# Patient Record
Sex: MALE | Race: OTHER | Employment: OTHER | URBAN - METROPOLITAN AREA
[De-identification: names, ages, dates, MRNs, and addresses within clinical notes are randomized per-mention and may not be internally consistent; named-entity substitution may affect disease eponyms.]

---

## 2017-02-28 ENCOUNTER — HOSPITAL ENCOUNTER (OUTPATIENT)
Dept: LAB | Age: 67
Discharge: HOME | End: 2017-02-28
Payer: MEDICARE

## 2017-02-28 DIAGNOSIS — C19 MALIGNANT NEOPLASM OF RECTOSIGMOID (COLON) (HCC): ICD-10-CM

## 2017-02-28 LAB
ALBUMIN SERPL BCP-MCNC: 3.7 G/DL (ref 3.2–4.6)
ALBUMIN/GLOB SERPL: 1.1 {RATIO} (ref 1.2–3.5)
ALP SERPL-CCNC: 113 U/L (ref 50–136)
ALT SERPL-CCNC: 31 U/L (ref 12–65)
ANION GAP BLD CALC-SCNC: 6 MMOL/L (ref 7–16)
AST SERPL W P-5'-P-CCNC: 18 U/L (ref 15–37)
BASOPHILS # BLD AUTO: 0 K/UL (ref 0–0.2)
BASOPHILS # BLD: 0 % (ref 0–2)
BILIRUB SERPL-MCNC: 0.6 MG/DL (ref 0.2–1.1)
BUN SERPL-MCNC: 14 MG/DL (ref 8–23)
CALCIUM SERPL-MCNC: 8.6 MG/DL (ref 8.3–10.4)
CEA SERPL-MCNC: 1 NG/ML (ref 0–3)
CHLORIDE SERPL-SCNC: 104 MMOL/L (ref 98–107)
CO2 SERPL-SCNC: 30 MMOL/L (ref 23–32)
CREAT SERPL-MCNC: 0.91 MG/DL (ref 0.8–1.5)
DIFFERENTIAL METHOD BLD: ABNORMAL
EOSINOPHIL # BLD: 0.1 K/UL (ref 0–0.8)
EOSINOPHIL NFR BLD: 1 % (ref 0.5–7.8)
ERYTHROCYTE [DISTWIDTH] IN BLOOD BY AUTOMATED COUNT: 11.8 % (ref 11.9–14.6)
GLOBULIN SER CALC-MCNC: 3.3 G/DL (ref 2.3–3.5)
GLUCOSE SERPL-MCNC: 91 MG/DL (ref 65–100)
HCT VFR BLD AUTO: 43.2 % (ref 41.1–50.3)
HGB BLD-MCNC: 14.9 G/DL (ref 13.6–17.2)
LYMPHOCYTES # BLD AUTO: 16 % (ref 13–44)
LYMPHOCYTES # BLD: 1.1 K/UL (ref 0.5–4.6)
MAGNESIUM SERPL-MCNC: 2.1 MG/DL (ref 1.8–2.4)
MCH RBC QN AUTO: 31.8 PG (ref 26.1–32.9)
MCHC RBC AUTO-ENTMCNC: 34.5 G/DL (ref 31.4–35)
MCV RBC AUTO: 92.3 FL (ref 79.6–97.8)
MONOCYTES # BLD: 0.5 K/UL (ref 0.1–1.3)
MONOCYTES NFR BLD AUTO: 7 % (ref 4–12)
NEUTS SEG # BLD: 5.4 K/UL (ref 1.7–8.2)
NEUTS SEG NFR BLD AUTO: 76 % (ref 43–78)
NRBC # BLD: 0 K/UL (ref 0–0.2)
PLATELET # BLD AUTO: 156 K/UL (ref 150–450)
PMV BLD AUTO: 10 FL (ref 10.8–14.1)
POTASSIUM SERPL-SCNC: 4.1 MMOL/L (ref 3.5–5.1)
PROT SERPL-MCNC: 7 G/DL (ref 6.3–8.2)
RBC # BLD AUTO: 4.68 M/UL (ref 4.23–5.67)
SODIUM SERPL-SCNC: 140 MMOL/L (ref 136–145)
WBC # BLD AUTO: 7 K/UL (ref 4.3–11.1)

## 2017-02-28 PROCEDURE — 80053 COMPREHEN METABOLIC PANEL: CPT | Performed by: INTERNAL MEDICINE

## 2017-02-28 PROCEDURE — 82378 CARCINOEMBRYONIC ANTIGEN: CPT | Performed by: INTERNAL MEDICINE

## 2017-02-28 PROCEDURE — 85025 COMPLETE CBC W/AUTO DIFF WBC: CPT | Performed by: INTERNAL MEDICINE

## 2017-02-28 PROCEDURE — 83735 ASSAY OF MAGNESIUM: CPT | Performed by: INTERNAL MEDICINE

## 2017-03-13 ENCOUNTER — HOSPITAL ENCOUNTER (OUTPATIENT)
Age: 67
Setting detail: OUTPATIENT SURGERY
Discharge: HOME | End: 2017-03-13
Attending: SURGERY | Admitting: SURGERY
Payer: MEDICARE

## 2017-03-13 ENCOUNTER — ANESTHESIA (OUTPATIENT)
Dept: ENDOSCOPY | Age: 67
End: 2017-03-13
Payer: MEDICARE

## 2017-03-13 ENCOUNTER — ANESTHESIA EVENT (OUTPATIENT)
Dept: ENDOSCOPY | Age: 67
End: 2017-03-13
Payer: MEDICARE

## 2017-03-13 ENCOUNTER — SURGERY (OUTPATIENT)
Age: 67
End: 2017-03-13

## 2017-03-13 VITALS
WEIGHT: 186 LBS | OXYGEN SATURATION: 100 % | HEART RATE: 47 BPM | RESPIRATION RATE: 18 BRPM | SYSTOLIC BLOOD PRESSURE: 151 MMHG | HEIGHT: 71 IN | DIASTOLIC BLOOD PRESSURE: 75 MMHG | TEMPERATURE: 97.5 F | BODY MASS INDEX: 26.04 KG/M2

## 2017-03-13 PROCEDURE — 74011250636 HC RX REV CODE- 250/636

## 2017-03-13 PROCEDURE — 76060000032 HC ANESTHESIA 0.5 TO 1 HR: Performed by: SURGERY

## 2017-03-13 PROCEDURE — C1726 CATH, BAL DIL, NON-VASCULAR: HCPCS | Performed by: SURGERY

## 2017-03-13 PROCEDURE — 76040000025: Performed by: SURGERY

## 2017-03-13 PROCEDURE — 74011000250 HC RX REV CODE- 250

## 2017-03-13 PROCEDURE — 74011250636 HC RX REV CODE- 250/636: Performed by: ANESTHESIOLOGY

## 2017-03-13 RX ORDER — LIDOCAINE HYDROCHLORIDE 20 MG/ML
INJECTION, SOLUTION EPIDURAL; INFILTRATION; INTRACAUDAL; PERINEURAL AS NEEDED
Status: DISCONTINUED | OUTPATIENT
Start: 2017-03-13 | End: 2017-03-13 | Stop reason: HOSPADM

## 2017-03-13 RX ORDER — PROPOFOL 10 MG/ML
INJECTION, EMULSION INTRAVENOUS
Status: DISCONTINUED | OUTPATIENT
Start: 2017-03-13 | End: 2017-03-13 | Stop reason: HOSPADM

## 2017-03-13 RX ORDER — PROPOFOL 10 MG/ML
INJECTION, EMULSION INTRAVENOUS AS NEEDED
Status: DISCONTINUED | OUTPATIENT
Start: 2017-03-13 | End: 2017-03-13 | Stop reason: HOSPADM

## 2017-03-13 RX ORDER — SODIUM CHLORIDE, SODIUM LACTATE, POTASSIUM CHLORIDE, CALCIUM CHLORIDE 600; 310; 30; 20 MG/100ML; MG/100ML; MG/100ML; MG/100ML
1000 INJECTION, SOLUTION INTRAVENOUS CONTINUOUS
Status: DISCONTINUED | OUTPATIENT
Start: 2017-03-13 | End: 2017-03-13 | Stop reason: HOSPADM

## 2017-03-13 RX ORDER — GLYCOPYRROLATE 0.2 MG/ML
INJECTION INTRAMUSCULAR; INTRAVENOUS AS NEEDED
Status: DISCONTINUED | OUTPATIENT
Start: 2017-03-13 | End: 2017-03-13 | Stop reason: HOSPADM

## 2017-03-13 RX ADMIN — SODIUM CHLORIDE, SODIUM LACTATE, POTASSIUM CHLORIDE, AND CALCIUM CHLORIDE 1000 ML: 600; 310; 30; 20 INJECTION, SOLUTION INTRAVENOUS at 10:36

## 2017-03-13 RX ADMIN — PROPOFOL 140 MCG/KG/MIN: 10 INJECTION, EMULSION INTRAVENOUS at 11:27

## 2017-03-13 RX ADMIN — PROPOFOL 50 MG: 10 INJECTION, EMULSION INTRAVENOUS at 11:27

## 2017-03-13 RX ADMIN — LIDOCAINE HYDROCHLORIDE 50 MG: 20 INJECTION, SOLUTION EPIDURAL; INFILTRATION; INTRACAUDAL; PERINEURAL at 11:27

## 2017-03-13 RX ADMIN — GLYCOPYRROLATE 0.2 MG: 0.2 INJECTION INTRAMUSCULAR; INTRAVENOUS at 11:30

## 2017-03-13 NOTE — ANESTHESIA PREPROCEDURE EVALUATION
Anesthetic History               Review of Systems / Medical History  Patient summary reviewed    Pulmonary                   Neuro/Psych              Cardiovascular                  Exercise tolerance: >4 METS     GI/Hepatic/Renal     GERD: well controlled      PUD     Endo/Other             Other Findings              Physical Exam    Airway  Mallampati: II  TM Distance: > 6 cm  Neck ROM: normal range of motion   Mouth opening: Normal     Cardiovascular  Regular rate and rhythm,  S1 and S2 normal,  no murmur, click, rub, or gallop             Dental  No notable dental hx       Pulmonary  Breath sounds clear to auscultation               Abdominal         Other Findings            Anesthetic Plan    ASA: 2  Anesthesia type: total IV anesthesia            Anesthetic plan and risks discussed with: Patient      Pt speaks littlle English, hx obtained via

## 2017-03-13 NOTE — IP AVS SNAPSHOT
[address] 
[phone] Patient: Tima Luo MRN: [id number] QPB:1/94/8900 You are allergic to the following Allergen Reactions No Known Allergies Other (comments) Pcn (Penicillins) Other (comments) Pt denies Recent Documentation Height Weight BMI Smoking Status 1.803 m 84.4 kg 25.94 kg/m2 Never Smoker Emergency Contacts Name Discharge Info Relation Home Work Mobile [address] [facility] CAREGIVER [3] Daughter [21] [phone]  Son [22] [phone] Lyudmila Sanchez DISCHARGE CAREGIVER [3] Spouse [3] [phone] About your hospitalization You were admitted on:  March 13, 2017 You last received care in the:  [facility] You were discharged on:  March 13, 2017 Unit phone number:  [phone] Why you were hospitalized Your primary diagnosis was:  Not on File Providers Seen During Your Hospitalizations Provider Role Specialty Primary office phone Tai Snow MD Attending Provider General Surgery [phone] Your Primary Care Physician (PCP) Primary Care Physician Office Phone Office Fax Joe Trammell [phone] Follow-up Information None Current Discharge Medication List  
  
ASK your doctor about these medications Dose & Instructions Dispensing Information Comments Morning Noon Evening Bedtime EMLA topical cream  
Generic drug:  lidocaine-prilocaine Your last dose was: Your next dose is: Other:  _________ Apply  to affected area as needed for Pain. Refills:  0 Discharge Instructions Brandon Milner M.D. 
[phone] Instructions following colonoscopy: 
 
ACTIVITY: 
? Resume usual, basic activities around the house today. ? You may be light-headed or sleepy from anesthesia, so be careful going up and down stairs. ? Avoid driving, operating machinery, or signing documents for 24 hours. DIET: 
? No restriction. Please note, some people may have nausea or cramps after this procedure which can result in an upset stomach after eating. ? Many people have loose stools or diarrhea immediately after colonoscopy. It is also not uncommon to not have a bowel movement for 2-3 days. PAIN: 
? Some cramping or gas pain is normal after colonoscopy. However, if you experience worsening pain over the course of the day, or pain with associated fever please call the office immediately CALL THE DOCTOR IF: 
? You have a temperature higher than 101.5° Fahrenheit for more than 6 hours. ? You have severe nausea or vomiting not relieved by medication; or diarrhea. If you take a blood thinning medicine resume it: 
 
Otherwise, continue home medications as previously prescribed. Discharge Orders None Introducing [facility] SERVICES! [facility] introduce portal paciente [employer] . Ahora se puede acceder a partes de ge expediente médico, enviar por correo electrónico la oficina de ge médico y solicitar renovaciones de medicamentos en línea. En ge navegador de Internet , Jimy Mar a [email or web address] Bob clic en el usuario por Debbie Negrete? Chrissy Just clic aquí en la sesión Daryle Neigh. Verá la página de registro [place]. Ingrese ge código de [facility] joe y vanessa aparece a continuación. Usted no tendrá que [employer] código después de rachel completado el proceso de registro . Si usted no se inscribe antes de la fecha de caducidad , debe solicitar un nuevo código. · [employer] Código de acceso : [id number] Expires: 4/20/2017  9:53 AM 
 
Ingresa los últimos cuatro dígitos de ge Número de Seguro Social ( xxxx ) y fecha de nacimiento ( dd / mm / aaaa ) vanessa se indica y bob clic en Enviar. Usted será llevado a la siguiente página de registro . Crear un ID MyChart . Esta será ge ID de inicio de sesión de MyChart y no puede ser [place] , por lo que pensar en karthikeyan que es Reyne Gretel y fácil de recordar . Crear karthikeyan contraseña MyChart . Usted puede cambiar ge contraseña en cualquier momento . Ingrese ge Password Reset de preguntas y James . [place] se puede utilizar en un momento posterior si usted olvida ge contraseña. Introduzca ge dirección de correo electrónico . Elvis Sow recibirá karthikeyan notificación por correo electrónico cuando la nueva información está disponible en MyChart . Luis Alberto ordonez en Registrarse. Dennice Vassar waleska y descargar porciones de ge expediente médico. 
Dana josé luis en el enlace de descarga del menú Resumen para descargar karthikeyan copia portátil de ge información médica . Si tiene [employer] , por favor visite la sección de preguntas frecuentes del sitio web MyChart . Recuerde, MyChart NO es que se utilizará para las necesidades urgentes. Para emergencias médicas , llame al 911 . Ahora disponible en ge iPhone y Android ! General Information Please provide this summary of care documentation to your next provider. Patient Signature:  ____________________________________________________________ Date:  ____________________________________________________________  
  
[facility] Provider Signature:  ____________________________________________________________ Date:  ____________________________________________________________  
  
  
   
  
Danny Mele Bonner [address] [facility] 
[phone] Patient: Melvin Houston MRN: [id number] LPV:6/85/7609 Tutu denton sigliza Hooks No Known Allergies Other (comments) Pcn (Penicillins) Other (comments) Pt denies Documentación recientes Height Weight BMI (IMC) Estatus de tabaquísmo 1.803 m 84.4 kg 25.94 kg/m2 Never Smoker Emergency Contacts Name Discharge Info Relation Home Work Mobile [address] CAREGIVER [3] Daughter [21] [phone] Nirali Bergeron  Son [22] [phone] Lyudmila Sanchez DISCHARGE CAREGIVER [3] Spouse [3] [phone] Sobre [place] hospitalización Le admitieron el:  March 13, 2017 [place] tratamiento más reciente fue el:  [facility] Le dieron de ricarda el:  March 13, 2017 Número de teléfono de la unidad:  [phone] Por qué le ingresaron [place] diagnosis primaria es:  No está archivado/a Proveedores de verse jasper cindy hospitalizaciones Personal Médico Rol Especialidad Teléfono principal de la oficina Sakina Montes MD Attending Provider General Surgery [phone] [place] médico de atención primaria (PCP ) Primary Care Physician Office Phone Office Fax Nathen Beverage [phone] Follow-up Information Memetales Aprobación de la gestión actual lista de medicamentos CONSULTE con cam[place] médico sobre "[employer] Dosis e instrucciones Información de dispensación Comentarios Morning Noon Evening Bedtime EMLA topical cream  
Medicamento genérico:  lidocaine-prilocaine Your last dose was: Your next dose is: Other:  _________ Apply  to affected area as needed for Pain. recargas:  0 Discharge Instructions Anh Vizcarra M.D. 
[phone] Instructions following colonoscopy: 
 
ACTIVITY: 
? Resume usual, basic activities around the house today. ? You may be light-headed or sleepy from anesthesia, so be careful going up and down stairs. ? Avoid driving, operating machinery, or signing documents for 24 hours. DIET: 
? No restriction.   Please note, some people may have nausea or cramps after this procedure which can result in an upset stomach after eating. ? Many people have loose stools or diarrhea immediately after colonoscopy. It is also not uncommon to not have a bowel movement for 2-3 days. PAIN: 
? Some cramping or gas pain is normal after colonoscopy. However, if you experience worsening pain over the course of the day, or pain with associated fever please call the office immediately CALL THE DOCTOR IF: 
? You have a temperature higher than 101.5° Fahrenheit for more than 6 hours. ? You have severe nausea or vomiting not relieved by medication; or diarrhea. If you take a blood thinning medicine resume it: 
 
Otherwise, continue home medications as previously prescribed. Discharge Orders [facility] Introducing [facility] HE[facility] SERVICES! [facility] introduce portal paciente MyChart . Ahora se puede acceder a partes de ge expediente médico, enviar por correo electrónico la oficina de ge médico y solicitar renovaciones de medicamentos en línea. En ge navegador de Internet , Shelly Mayer a [email or web address] Bob clic en el usuario por Chica Jairo? Alek Wynn clic aquí en la sesión Paul Nassar. Verá la página de registro [place]. Ingrese ge código de [facility] joe y vanessa aparece a continuación. Usted no tendrá que [employer] código después de rachel completado el proceso de registro . Si usted no se inscribe antes de la fecha de caducidad , debe solicitar un nuevo código. · MyChart Código de acceso : [id number] Expires: 4/20/2017  9:53 AM 
 
Ingresa los últimos cuatro dígitos de ge Número de Seguro Social ( xxxx ) y fecha de nacimiento ( dd / mm / aaaa ) vanessa se indica y bob clic en Enviar. Usted será llevado a la siguiente página de registro . Crear un ID Caio . Esta será ge ID de inicio de sesión de MyChart y no puede ser [place] , por lo que pensar en karthikeyan que es [place] y fácil de recordar . Crear karthikeyan contraseña MyChart . Usted puede cambiar ge contraseña en cualquier momento . Ingrese ge Password Reset de preguntas y James . [place] se puede utilizar en un momento posterior si usted olvida ge contraseña. Introduzca ge dirección de correo electrónico . Javier Kidney recibirá karthikeyan notificación por correo electrónico cuando la nueva información está disponible en MyChart . Noe ordonez en Registrarse. K[place] waleska y descargar porciones de ge expediente médico. 
Dana ordonez en el enlace de descarga del menú Resumen para descargar karthikeyan copia portátil de ge información médica . Si tiene [employer] , por favor visite la sección de preguntas frecuentes del sitio web MyChart . Recuerde, MyChart NO es que se utilizará para las necesidades urgentes. Para emergencias médicas , llame al 911 . Ahora disponible en ge iPhone y Android ! General Information Please provide this summary of care documentation to your next provider. Patient Signature:  ____________________________________________________________ Date:  ____________________________________________________________  
  
[facility] Signature:  ____________________________________________________________ Date:  ____________________________________________________________

## 2017-03-13 NOTE — PROGRESS NOTES
Discharge instructions reviewed with patient and family. Copy signed and placed in patient chart. Additional copy handed to family after name verification with Chavo Dominguez RN.

## 2017-03-13 NOTE — PROCEDURES
Procedure in Detail:  Informed consent was obtained for the procedure. The patient was placed in the left lateral decubitus position and sedation was induced by anesthesia. The [id number] was inserted into the rectum and advanced under direct vision to the cecum, which was identified by the ileocecal valve and appendiceal orifice. The quality of the colonic preparation was adequate. A careful inspection was made as the colonoscope was withdrawn, including a retroflexed view of the rectum; findings and interventions are described below. Appropriate photodocumentation was obtained. An upper scope was used to pass the CRE balloon as noted below. Findings:   Rectum:   Normal    - patent anastomosis at 19cm. anastomosis mildly narrowed. dilation attempted with 18-20 CRE balloon with no effect with estimated lumen 22-23mm  Sigmoid:   Surgically absent  Descending Colon:   Normal  Transverse Colon:   Normal  Ascending Colon:   Normal  Cecum:   Normal          Specimens: No specimens were collected. Complications: None; patient tolerated the procedure well. \    EBL - minimal    Recommendations:   - Repeat colonoscopy in 3 years.      Signed By: Td Vásquez MD                        March 13, 2017

## 2017-03-13 NOTE — DISCHARGE INSTRUCTIONS
Brittany Powell M.D.  [phone]    Instructions following colonoscopy:    ACTIVITY:   Resume usual, basic activities around the house today.  You may be light-headed or sleepy from anesthesia, so be careful going up and down stairs.  Avoid driving, operating machinery, or signing documents for 24 hours. DIET:   No restriction. Please note, some people may have nausea or cramps after this procedure which can result in an upset stomach after eating.  Many people have loose stools or diarrhea immediately after colonoscopy. It is also not uncommon to not have a bowel movement for 2-3 days. PAIN:   Some cramping or gas pain is normal after colonoscopy. However, if you experience worsening pain over the course of the day, or pain with associated fever please call the office immediately      [address] IF:   You have a temperature higher than 101.5° Fahrenheit for more than 6 hours.  You have severe nausea or vomiting not relieved by medication; or diarrhea. If you take a blood thinning medicine resume it:    Otherwise, continue home medications as previously prescribed.

## 2017-03-13 NOTE — H&P
History and Physical      Patient: Cinthia Barrios    Physician: Audrey Marley MD    Referring Physician: No ref. provider found    Chief Complaint: For colonoscopy    History of Present Illness: Pt presents for colonoscopy. 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 History:  Past Medical History:   Diagnosis Date    Allergic rhinitis     BPH (benign prostatic hyperplasia) 6/6/16    per pt- used to be on medication- off due to expense    Gastrointestinal disorder     ulcers    GERD (gastroesophageal reflux disease)     occ-- no meds    Malignant neoplasm of rectosigmoid (colon) ([address] 2016    surg and chemo    PUD (peptic ulcer disease) yrs ago    Bleeding ulcer    Stool color black     Constipation, hemrrhoids     Past Surgical History:   Procedure Laterality Date    COLONOSCOPY N/A 6/10/2016    COLONOSCOPY performed by Cinthya Beatty MD at [place] [address] N/A 6/13/2016    SIGMOIDOSCOPY FLEXIBLE performed [facility] at [facility] ENDOSCOPY    HX COLONOSCOPY      Had 18 months ago in his country    HX GI  2016    Laparoscopic-assisted sigmoid colectomy with laparoscopic    HX VASCULAR ACCESS      port placed--left      Social History     Social History    Marital status:      Spouse name: N/A    Number of children: N/A    Years of education: N/A     Social History Main Topics    Smoking status: Never Smoker    Smokeless tobacco: Never Used    Alcohol use No    Drug use: No    Sexual activity: Not Asked     Other Topics Concern    None     Social History Narrative      Family History   Problem Relation Age of Onset    Cancer Brother     Colon Cancer Neg Hx        Medications:   Prior to Admission medications    Medication Sig Start Date End Date Taking? Authorizing Provider   lidocaine-prilocaine (EMLA) topical cream Apply  to affected area as needed for Pain. Historical Provider       Allergies:    Allergies   Allergen Reactions    No Known Allergies Other (comments)    Pcn [Penicillins] Other (comments)     Pt denies       Physical Exam:     Vital Signs:   Visit Vitals    /70    Pulse (!) 51    Temp 97.6 °F (36.4 °C)    Resp 16    Ht 5' 11\" (1.803 m)    Wt 186 lb (84.4 kg)    SpO2 99%    BMI 25.94 kg/m2     . General: no distress      Heart: regular   Lungs: unlabored   Abdominal: soft   Neurological: Grossly normal        Findings/Diagnosis: Screening for colorectal cancer in pt with personal history of colon cancer. Plan of Care/Planned Procedure: Colonoscopy, possible polypectomy. Pt/designee agree to proceed.       Signed:  Audrey Marley MD   3/13/2017

## 2017-03-15 NOTE — ANESTHESIA POSTPROCEDURE EVALUATION
Post-Anesthesia Evaluation and Assessment    Patient: Margaret Gage MRN: [id number]  SSN: [id number]    YOB: 1950  Age: 73 y.o. Sex: male       Cardiovascular Function/Vital Signs  Visit Vitals    /75    Pulse (!) 47    Temp 36.4 °C (97.5 °F)    Resp 18    Ht 5' 11\" (1.803 m)    Wt 84.4 kg (186 lb)    SpO2 100%    BMI 25.94 kg/m2       Patient is status post total IV anesthesia anesthesia for Procedure(s):  COLONOSCOPY/ 27  COLON DILATATION. Nausea/Vomiting: None    Postoperative hydration reviewed and adequate. Pain:  Pain Scale 1: Numeric (0 - 10) (03/13/17 1224)  Pain Intensity 1: 0 (03/13/17 1224)   Managed    Neurological Status: At baseline    Mental Status and Level of Consciousness: Arousable    Pulmonary Status:   O2 Device: Room air (03/13/17 1224)   Adequate oxygenation and airway patent    Complications related to anesthesia: None    Post-anesthesia assessment completed.  No concerns    Signed By: Columba Romo MD

## 2017-05-31 ENCOUNTER — HOSPITAL ENCOUNTER (OUTPATIENT)
Dept: LAB | Age: 67
Discharge: HOME | End: 2017-05-31
Payer: MEDICARE

## 2017-05-31 DIAGNOSIS — C19 MALIGNANT NEOPLASM OF RECTOSIGMOID (COLON) (HCC): ICD-10-CM

## 2017-05-31 LAB
ALBUMIN SERPL BCP-MCNC: 4 G/DL (ref 3.2–4.6)
ALBUMIN/GLOB SERPL: 1.2 {RATIO} (ref 1.2–3.5)
ALP SERPL-CCNC: 103 U/L (ref 50–136)
ALT SERPL-CCNC: 26 U/L (ref 12–65)
ANION GAP BLD CALC-SCNC: 6 MMOL/L (ref 7–16)
AST SERPL W P-5'-P-CCNC: 21 U/L (ref 15–37)
BASOPHILS # BLD AUTO: 0 K/UL (ref 0–0.2)
BASOPHILS # BLD: 1 % (ref 0–2)
BILIRUB SERPL-MCNC: 0.8 MG/DL (ref 0.2–1.1)
BUN SERPL-MCNC: 14 MG/DL (ref 8–23)
CALCIUM SERPL-MCNC: 8.9 MG/DL (ref 8.3–10.4)
CEA SERPL-MCNC: 1.1 NG/ML (ref 0–3)
CHLORIDE SERPL-SCNC: 107 MMOL/L (ref 98–107)
CO2 SERPL-SCNC: 29 MMOL/L (ref 21–32)
CREAT SERPL-MCNC: 1.05 MG/DL (ref 0.8–1.5)
DIFFERENTIAL METHOD BLD: ABNORMAL
EOSINOPHIL # BLD: 0.1 K/UL (ref 0–0.8)
EOSINOPHIL NFR BLD: 2 % (ref 0.5–7.8)
ERYTHROCYTE [DISTWIDTH] IN BLOOD BY AUTOMATED COUNT: 12.6 % (ref 11.9–14.6)
GLOBULIN SER CALC-MCNC: 3.3 G/DL (ref 2.3–3.5)
GLUCOSE SERPL-MCNC: 95 MG/DL (ref 65–100)
HCT VFR BLD AUTO: 40.6 % (ref 41.1–50.3)
HGB BLD-MCNC: 14.4 G/DL (ref 13.6–17.2)
LYMPHOCYTES # BLD AUTO: 32 % (ref 13–44)
LYMPHOCYTES # BLD: 1.3 K/UL (ref 0.5–4.6)
MAGNESIUM SERPL-MCNC: 2.2 MG/DL (ref 1.8–2.4)
MCH RBC QN AUTO: 32.6 PG (ref 26.1–32.9)
MCHC RBC AUTO-ENTMCNC: 35.5 G/DL (ref 31.4–35)
MCV RBC AUTO: 91.9 FL (ref 79.6–97.8)
MONOCYTES # BLD: 0.4 K/UL (ref 0.1–1.3)
MONOCYTES NFR BLD AUTO: 10 % (ref 4–12)
NEUTS SEG # BLD: 2.3 K/UL (ref 1.7–8.2)
NEUTS SEG NFR BLD AUTO: 56 % (ref 43–78)
NRBC # BLD: 0 K/UL (ref 0–0.2)
PLATELET # BLD AUTO: 148 K/UL (ref 150–450)
PMV BLD AUTO: 9.9 FL (ref 10.8–14.1)
POTASSIUM SERPL-SCNC: 4.1 MMOL/L (ref 3.5–5.1)
PROT SERPL-MCNC: 7.3 G/DL (ref 6.3–8.2)
RBC # BLD AUTO: 4.42 M/UL (ref 4.23–5.67)
SODIUM SERPL-SCNC: 142 MMOL/L (ref 136–145)
WBC # BLD AUTO: 4.2 K/UL (ref 4.3–11.1)

## 2017-05-31 PROCEDURE — 85025 COMPLETE CBC W/AUTO DIFF WBC: CPT | Performed by: INTERNAL MEDICINE

## 2017-05-31 PROCEDURE — 82378 CARCINOEMBRYONIC ANTIGEN: CPT | Performed by: INTERNAL MEDICINE

## 2017-05-31 PROCEDURE — 80053 COMPREHEN METABOLIC PANEL: CPT | Performed by: INTERNAL MEDICINE

## 2017-05-31 PROCEDURE — 83735 ASSAY OF MAGNESIUM: CPT | Performed by: INTERNAL MEDICINE

## 2017-08-07 ENCOUNTER — HOSPITAL ENCOUNTER (OUTPATIENT)
Dept: CT IMAGING | Age: 67
Discharge: HOME | End: 2017-08-07
Attending: INTERNAL MEDICINE
Payer: MEDICARE

## 2017-08-07 VITALS — BODY MASS INDEX: 26.04 KG/M2 | WEIGHT: 186 LBS | HEIGHT: 71 IN

## 2017-08-07 DIAGNOSIS — C19 MALIGNANT NEOPLASM OF RECTOSIGMOID (COLON) (HCC): ICD-10-CM

## 2017-08-07 LAB — CREAT BLD-MCNC: 0.8 MG/DL (ref 0.6–1)

## 2017-08-07 PROCEDURE — 74011250636 HC RX REV CODE- 250/636: Performed by: INTERNAL MEDICINE

## 2017-08-07 PROCEDURE — 74011636320 HC RX REV CODE- 636/320: Performed by: INTERNAL MEDICINE

## 2017-08-07 PROCEDURE — 82565 ASSAY OF CREATININE: CPT

## 2017-08-07 PROCEDURE — 74177 CT ABD & PELVIS W/CONTRAST: CPT

## 2017-08-07 PROCEDURE — 74011000258 HC RX REV CODE- 258: Performed by: INTERNAL MEDICINE

## 2017-08-07 RX ORDER — HEPARIN 100 UNIT/ML
500 SYRINGE INTRAVENOUS ONCE
Status: COMPLETED | OUTPATIENT
Start: 2017-08-07 | End: 2017-08-07

## 2017-08-07 RX ORDER — SODIUM CHLORIDE 0.9 % (FLUSH) 0.9 %
10 SYRINGE (ML) INJECTION
Status: COMPLETED | OUTPATIENT
Start: 2017-08-07 | End: 2017-08-07

## 2017-08-07 RX ADMIN — SODIUM CHLORIDE 100 ML: 900 INJECTION, SOLUTION INTRAVENOUS at 10:48

## 2017-08-07 RX ADMIN — DIATRIZOATE MEGLUMINE AND DIATRIZOATE SODIUM 15 ML: 660; 100 LIQUID ORAL; RECTAL at 10:48

## 2017-08-07 RX ADMIN — SODIUM CHLORIDE, PRESERVATIVE FREE 500 UNITS: 5 INJECTION INTRAVENOUS at 11:00

## 2017-08-07 RX ADMIN — Medication 10 ML: at 10:48

## 2017-08-07 RX ADMIN — IOPAMIDOL 100 ML: 755 INJECTION, SOLUTION INTRAVENOUS at 10:48

## 2017-08-30 ENCOUNTER — HOSPITAL ENCOUNTER (OUTPATIENT)
Dept: LAB | Age: 67
Discharge: HOME | End: 2017-08-30
Payer: MEDICARE

## 2017-08-30 DIAGNOSIS — C19 MALIGNANT NEOPLASM OF RECTOSIGMOID (COLON) (HCC): ICD-10-CM

## 2017-08-30 LAB
ALBUMIN SERPL-MCNC: 3.8 G/DL (ref 3.2–4.6)
ALBUMIN/GLOB SERPL: 1.2 {RATIO} (ref 1.2–3.5)
ALP SERPL-CCNC: 96 U/L (ref 50–136)
ALT SERPL-CCNC: 22 U/L (ref 12–65)
ANION GAP SERPL CALC-SCNC: 5 MMOL/L (ref 7–16)
AST SERPL-CCNC: 18 U/L (ref 15–37)
BASOPHILS # BLD: 0 K/UL (ref 0–0.2)
BASOPHILS NFR BLD: 0 % (ref 0–2)
BILIRUB SERPL-MCNC: 0.6 MG/DL (ref 0.2–1.1)
BUN SERPL-MCNC: 18 MG/DL (ref 8–23)
CALCIUM SERPL-MCNC: 8.6 MG/DL (ref 8.3–10.4)
CEA SERPL-MCNC: 1.4 NG/ML (ref 0–3)
CHLORIDE SERPL-SCNC: 105 MMOL/L (ref 98–107)
CO2 SERPL-SCNC: 28 MMOL/L (ref 21–32)
CREAT SERPL-MCNC: 0.84 MG/DL (ref 0.8–1.5)
DIFFERENTIAL METHOD BLD: ABNORMAL
EOSINOPHIL # BLD: 0.1 K/UL (ref 0–0.8)
EOSINOPHIL NFR BLD: 2 % (ref 0.5–7.8)
ERYTHROCYTE [DISTWIDTH] IN BLOOD BY AUTOMATED COUNT: 12.3 % (ref 11.9–14.6)
GLOBULIN SER CALC-MCNC: 3.2 G/DL (ref 2.3–3.5)
GLUCOSE SERPL-MCNC: 92 MG/DL (ref 65–100)
HCT VFR BLD AUTO: 40.4 % (ref 41.1–50.3)
HGB BLD-MCNC: 14.5 G/DL (ref 13.6–17.2)
LYMPHOCYTES # BLD: 1.6 K/UL (ref 0.5–4.6)
LYMPHOCYTES NFR BLD: 34 % (ref 13–44)
MCH RBC QN AUTO: 32.9 PG (ref 26.1–32.9)
MCHC RBC AUTO-ENTMCNC: 35.9 G/DL (ref 31.4–35)
MCV RBC AUTO: 91.6 FL (ref 79.6–97.8)
MONOCYTES # BLD: 0.4 K/UL (ref 0.1–1.3)
MONOCYTES NFR BLD: 9 % (ref 4–12)
NEUTS SEG # BLD: 2.5 K/UL (ref 1.7–8.2)
NEUTS SEG NFR BLD: 55 % (ref 43–78)
NRBC # BLD: 0 K/UL (ref 0–0.2)
NRBC BLD-RTO: 0 PER 100 WBC (ref 0–2)
PLATELET # BLD AUTO: 143 K/UL (ref 150–450)
PMV BLD AUTO: 10 FL (ref 10.8–14.1)
POTASSIUM SERPL-SCNC: 4 MMOL/L (ref 3.5–5.1)
PROT SERPL-MCNC: 7 G/DL (ref 6.3–8.2)
RBC # BLD AUTO: 4.41 M/UL (ref 4.23–5.67)
SODIUM SERPL-SCNC: 138 MMOL/L (ref 136–145)
WBC # BLD AUTO: 4.6 K/UL (ref 4.3–11.1)

## 2017-08-30 PROCEDURE — 82378 CARCINOEMBRYONIC ANTIGEN: CPT | Performed by: INTERNAL MEDICINE

## 2017-08-30 PROCEDURE — 80053 COMPREHEN METABOLIC PANEL: CPT | Performed by: INTERNAL MEDICINE

## 2017-08-30 PROCEDURE — 85025 COMPLETE CBC W/AUTO DIFF WBC: CPT | Performed by: INTERNAL MEDICINE

## 2018-03-05 ENCOUNTER — HOSPITAL ENCOUNTER (OUTPATIENT)
Dept: LAB | Age: 68
Discharge: HOME | End: 2018-03-05
Payer: MEDICARE

## 2018-03-05 DIAGNOSIS — C19 MALIGNANT NEOPLASM OF RECTOSIGMOID (COLON) (HCC): ICD-10-CM

## 2018-03-05 LAB
ALBUMIN SERPL-MCNC: 3.8 G/DL (ref 3.2–4.6)
ALBUMIN/GLOB SERPL: 1.2 {RATIO} (ref 1.2–3.5)
ALP SERPL-CCNC: 104 U/L (ref 50–136)
ALT SERPL-CCNC: 24 U/L (ref 12–65)
ANION GAP SERPL CALC-SCNC: 6 MMOL/L (ref 7–16)
AST SERPL-CCNC: 17 U/L (ref 15–37)
BASOPHILS # BLD: 0 K/UL (ref 0–0.2)
BASOPHILS NFR BLD: 1 % (ref 0–2)
BILIRUB SERPL-MCNC: 0.3 MG/DL (ref 0.2–1.1)
BUN SERPL-MCNC: 15 MG/DL (ref 8–23)
CALCIUM SERPL-MCNC: 8.6 MG/DL (ref 8.3–10.4)
CEA SERPL-MCNC: 2.7 NG/ML (ref 0–3)
CHLORIDE SERPL-SCNC: 106 MMOL/L (ref 98–107)
CO2 SERPL-SCNC: 30 MMOL/L (ref 21–32)
CREAT SERPL-MCNC: 0.96 MG/DL (ref 0.8–1.5)
DIFFERENTIAL METHOD BLD: ABNORMAL
EOSINOPHIL # BLD: 0.1 K/UL (ref 0–0.8)
EOSINOPHIL NFR BLD: 3 % (ref 0.5–7.8)
ERYTHROCYTE [DISTWIDTH] IN BLOOD BY AUTOMATED COUNT: 12.5 % (ref 11.9–14.6)
GLOBULIN SER CALC-MCNC: 3.2 G/DL (ref 2.3–3.5)
GLUCOSE SERPL-MCNC: 96 MG/DL (ref 65–100)
HCT VFR BLD AUTO: 41.6 % (ref 41.1–50.3)
HGB BLD-MCNC: 14.9 G/DL (ref 13.6–17.2)
LYMPHOCYTES # BLD: 1.6 K/UL (ref 0.5–4.6)
LYMPHOCYTES NFR BLD: 35 % (ref 13–44)
MCH RBC QN AUTO: 33.3 PG (ref 26.1–32.9)
MCHC RBC AUTO-ENTMCNC: 35.8 G/DL (ref 31.4–35)
MCV RBC AUTO: 92.9 FL (ref 79.6–97.8)
MONOCYTES # BLD: 0.4 K/UL (ref 0.1–1.3)
MONOCYTES NFR BLD: 10 % (ref 4–12)
NEUTS SEG # BLD: 2.4 K/UL (ref 1.7–8.2)
NEUTS SEG NFR BLD: 52 % (ref 43–78)
NRBC # BLD: 0 K/UL (ref 0–0.2)
PLATELET # BLD AUTO: 149 K/UL (ref 150–450)
PMV BLD AUTO: 10 FL (ref 10.8–14.1)
POTASSIUM SERPL-SCNC: 4.1 MMOL/L (ref 3.5–5.1)
PROT SERPL-MCNC: 7 G/DL (ref 6.3–8.2)
RBC # BLD AUTO: 4.48 M/UL (ref 4.23–5.67)
SODIUM SERPL-SCNC: 142 MMOL/L (ref 136–145)
WBC # BLD AUTO: 4.6 K/UL (ref 4.3–11.1)

## 2018-03-05 PROCEDURE — 85025 COMPLETE CBC W/AUTO DIFF WBC: CPT | Performed by: INTERNAL MEDICINE

## 2018-03-05 PROCEDURE — 80053 COMPREHEN METABOLIC PANEL: CPT | Performed by: INTERNAL MEDICINE

## 2018-03-05 PROCEDURE — 82378 CARCINOEMBRYONIC ANTIGEN: CPT | Performed by: INTERNAL MEDICINE

## 2018-03-22 ENCOUNTER — HOSPITAL ENCOUNTER (OUTPATIENT)
Dept: CT IMAGING | Age: 68
Discharge: HOME | End: 2018-03-22
Attending: INTERNAL MEDICINE
Payer: MEDICARE

## 2018-03-22 VITALS — HEIGHT: 71 IN | BODY MASS INDEX: 26.04 KG/M2 | WEIGHT: 186 LBS

## 2018-03-22 DIAGNOSIS — C19 MALIGNANT NEOPLASM OF RECTOSIGMOID (COLON) (HCC): ICD-10-CM

## 2018-03-22 PROCEDURE — 74011250636 HC RX REV CODE- 250/636: Performed by: INTERNAL MEDICINE

## 2018-03-22 PROCEDURE — 74011000258 HC RX REV CODE- 258: Performed by: INTERNAL MEDICINE

## 2018-03-22 PROCEDURE — 74011636320 HC RX REV CODE- 636/320: Performed by: INTERNAL MEDICINE

## 2018-03-22 PROCEDURE — 74177 CT ABD & PELVIS W/CONTRAST: CPT

## 2018-03-22 RX ORDER — HEPARIN 100 UNIT/ML
500 SYRINGE INTRAVENOUS AS NEEDED
Status: DISCONTINUED | OUTPATIENT
Start: 2018-03-22 | End: 2018-03-26 | Stop reason: HOSPADM

## 2018-03-22 RX ORDER — SODIUM CHLORIDE 0.9 % (FLUSH) 0.9 %
10 SYRINGE (ML) INJECTION
Status: COMPLETED | OUTPATIENT
Start: 2018-03-22 | End: 2018-03-22

## 2018-03-22 RX ADMIN — SODIUM CHLORIDE 100 ML: 900 INJECTION, SOLUTION INTRAVENOUS at 15:10

## 2018-03-22 RX ADMIN — Medication 10 ML: at 15:10

## 2018-03-22 RX ADMIN — DIATRIZOATE MEGLUMINE AND DIATRIZOATE SODIUM 15 ML: 660; 100 LIQUID ORAL; RECTAL at 15:10

## 2018-03-22 RX ADMIN — SODIUM CHLORIDE, PRESERVATIVE FREE 500 UNITS: 5 INJECTION INTRAVENOUS at 15:19

## 2018-03-22 RX ADMIN — IOPAMIDOL 100 ML: 755 INJECTION, SOLUTION INTRAVENOUS at 15:10

## 2018-09-20 ENCOUNTER — HOSPITAL ENCOUNTER (OUTPATIENT)
Dept: CT IMAGING | Age: 68
Discharge: HOME | End: 2018-09-20
Attending: INTERNAL MEDICINE
Payer: MEDICARE

## 2018-09-20 DIAGNOSIS — C19 MALIGNANT NEOPLASM OF RECTOSIGMOID (COLON) (HCC): ICD-10-CM

## 2018-09-20 LAB — CREAT BLD-MCNC: 0.9 MG/DL (ref 0.8–1.5)

## 2018-09-20 PROCEDURE — 82565 ASSAY OF CREATININE: CPT

## 2018-09-20 PROCEDURE — 74177 CT ABD & PELVIS W/CONTRAST: CPT

## 2018-09-20 PROCEDURE — 74011636320 HC RX REV CODE- 636/320: Performed by: INTERNAL MEDICINE

## 2018-09-20 PROCEDURE — 74011000258 HC RX REV CODE- 258: Performed by: INTERNAL MEDICINE

## 2018-09-20 RX ORDER — SODIUM CHLORIDE 0.9 % (FLUSH) 0.9 %
10 SYRINGE (ML) INJECTION
Status: COMPLETED | OUTPATIENT
Start: 2018-09-20 | End: 2018-09-20

## 2018-09-20 RX ADMIN — Medication 10 ML: at 10:56

## 2018-09-20 RX ADMIN — DIATRIZOATE MEGLUMINE AND DIATRIZOATE SODIUM 15 ML: 660; 100 LIQUID ORAL; RECTAL at 10:56

## 2018-09-20 RX ADMIN — IOPAMIDOL 100 ML: 755 INJECTION, SOLUTION INTRAVENOUS at 10:56

## 2018-09-20 RX ADMIN — SODIUM CHLORIDE 100 ML: 900 INJECTION, SOLUTION INTRAVENOUS at 10:56

## 2018-09-27 ENCOUNTER — HOSPITAL ENCOUNTER (OUTPATIENT)
Dept: LAB | Age: 68
Discharge: HOME | End: 2018-09-27
Payer: MEDICARE

## 2018-09-27 DIAGNOSIS — C19 MALIGNANT NEOPLASM OF RECTOSIGMOID (COLON) (HCC): ICD-10-CM

## 2018-09-27 LAB
ALBUMIN SERPL-MCNC: 3.8 G/DL (ref 3.2–4.6)
ALBUMIN/GLOB SERPL: 1.2 {RATIO} (ref 1.2–3.5)
ALP SERPL-CCNC: 75 U/L (ref 50–136)
ALT SERPL-CCNC: 24 U/L (ref 12–65)
ANION GAP SERPL CALC-SCNC: ABNORMAL MMOL/L (ref 7–16)
AST SERPL-CCNC: 14 U/L (ref 15–37)
BASOPHILS # BLD: 0 K/UL (ref 0–0.2)
BASOPHILS NFR BLD: 0 % (ref 0–2)
BILIRUB SERPL-MCNC: 0.4 MG/DL (ref 0.2–1.1)
BUN SERPL-MCNC: 14 MG/DL (ref 8–23)
CALCIUM SERPL-MCNC: 8.6 MG/DL (ref 8.3–10.4)
CEA SERPL-MCNC: 6 NG/ML (ref 0–3)
CHLORIDE SERPL-SCNC: 108 MMOL/L (ref 98–107)
CO2 SERPL-SCNC: 30 MMOL/L (ref 21–32)
CREAT SERPL-MCNC: 0.98 MG/DL (ref 0.8–1.5)
DIFFERENTIAL METHOD BLD: ABNORMAL
EOSINOPHIL # BLD: 0.1 K/UL (ref 0–0.8)
EOSINOPHIL NFR BLD: 2 % (ref 0.5–7.8)
ERYTHROCYTE [DISTWIDTH] IN BLOOD BY AUTOMATED COUNT: 12.2 % (ref 11.9–14.6)
GLOBULIN SER CALC-MCNC: 3.2 G/DL (ref 2.3–3.5)
GLUCOSE SERPL-MCNC: 98 MG/DL (ref 65–100)
HCT VFR BLD AUTO: 41.5 % (ref 41.1–50.3)
HGB BLD-MCNC: 14.4 G/DL (ref 13.6–17.2)
IMM GRANULOCYTES # BLD: 0 K/UL (ref 0–0.5)
IMM GRANULOCYTES NFR BLD AUTO: 0 % (ref 0–5)
LYMPHOCYTES # BLD: 1.4 K/UL (ref 0.5–4.6)
LYMPHOCYTES NFR BLD: 28 % (ref 13–44)
MCH RBC QN AUTO: 32.4 PG (ref 26.1–32.9)
MCHC RBC AUTO-ENTMCNC: 34.7 G/DL (ref 31.4–35)
MCV RBC AUTO: 93.5 FL (ref 79.6–97.8)
MONOCYTES # BLD: 0.6 K/UL (ref 0.1–1.3)
MONOCYTES NFR BLD: 11 % (ref 4–12)
NEUTS SEG # BLD: 2.9 K/UL (ref 1.7–8.2)
NEUTS SEG NFR BLD: 58 % (ref 43–78)
NRBC # BLD: 0 K/UL (ref 0–0.2)
PLATELET # BLD AUTO: 148 K/UL (ref 150–450)
PMV BLD AUTO: 10 FL (ref 9.4–12.3)
POTASSIUM SERPL-SCNC: 4 MMOL/L (ref 3.5–5.1)
PROT SERPL-MCNC: 7 G/DL (ref 6.3–8.2)
RBC # BLD AUTO: 4.44 M/UL (ref 4.23–5.67)
SODIUM SERPL-SCNC: 137 MMOL/L (ref 136–145)
WBC # BLD AUTO: 5 K/UL (ref 4.3–11.1)

## 2018-09-27 PROCEDURE — 82378 CARCINOEMBRYONIC ANTIGEN: CPT

## 2018-09-27 PROCEDURE — 84295 ASSAY OF SERUM SODIUM: CPT

## 2018-09-27 PROCEDURE — 84132 ASSAY OF SERUM POTASSIUM: CPT

## 2018-09-27 PROCEDURE — 80053 COMPREHEN METABOLIC PANEL: CPT

## 2018-09-27 PROCEDURE — 85025 COMPLETE CBC W/AUTO DIFF WBC: CPT

## 2018-09-27 PROCEDURE — 82435 ASSAY OF BLOOD CHLORIDE: CPT

## 2019-03-21 ENCOUNTER — HOSPITAL ENCOUNTER (OUTPATIENT)
Dept: PET IMAGING | Age: 69
Discharge: HOME | End: 2019-03-21
Payer: MEDICARE

## 2019-03-21 DIAGNOSIS — C19 MALIGNANT NEOPLASM OF RECTOSIGMOID (COLON) (HCC): ICD-10-CM

## 2019-03-21 PROCEDURE — A9552 F18 FDG: HCPCS

## 2019-03-21 PROCEDURE — 74011636320 HC RX REV CODE- 636/320: Performed by: INTERNAL MEDICINE

## 2019-03-21 RX ORDER — SODIUM CHLORIDE 0.9 % (FLUSH) 0.9 %
5-10 SYRINGE (ML) INJECTION
Status: COMPLETED | OUTPATIENT
Start: 2019-03-21 | End: 2019-03-21

## 2019-03-21 RX ADMIN — DIATRIZOATE MEGLUMINE AND DIATRIZOATE SODIUM 10 ML: 660; 100 LIQUID ORAL; RECTAL at 09:57

## 2019-03-21 RX ADMIN — Medication 10 ML: at 09:57

## 2019-03-27 ENCOUNTER — HOSPITAL ENCOUNTER (OUTPATIENT)
Dept: LAB | Age: 69
Discharge: HOME | End: 2019-03-27
Payer: MEDICARE

## 2019-03-27 DIAGNOSIS — C19 MALIGNANT NEOPLASM OF RECTOSIGMOID (COLON) (HCC): ICD-10-CM

## 2019-03-27 LAB
ALBUMIN SERPL-MCNC: 3.7 G/DL (ref 3.2–4.6)
ALBUMIN/GLOB SERPL: 1 {RATIO}
ALP SERPL-CCNC: 96 U/L (ref 50–136)
ALT SERPL-CCNC: 23 U/L (ref 12–65)
ANION GAP SERPL CALC-SCNC: 4 MMOL/L
AST SERPL-CCNC: 21 U/L (ref 15–37)
BASOPHILS # BLD: 0 K/UL (ref 0–0.2)
BASOPHILS NFR BLD: 1 % (ref 0–2)
BILIRUB SERPL-MCNC: 0.4 MG/DL (ref 0.2–1.1)
BUN SERPL-MCNC: 12 MG/DL (ref 8–23)
CALCIUM SERPL-MCNC: 9.3 MG/DL (ref 8.3–10.4)
CEA SERPL-MCNC: 19.8 NG/ML (ref 0–3)
CHLORIDE SERPL-SCNC: 104 MMOL/L (ref 98–107)
CO2 SERPL-SCNC: 32 MMOL/L (ref 21–32)
CREAT SERPL-MCNC: 0.9 MG/DL (ref 0.8–1.5)
DIFFERENTIAL METHOD BLD: ABNORMAL
EOSINOPHIL # BLD: 0.4 K/UL (ref 0–0.8)
EOSINOPHIL NFR BLD: 8 % (ref 0.5–7.8)
ERYTHROCYTE [DISTWIDTH] IN BLOOD BY AUTOMATED COUNT: 12.8 % (ref 11.9–14.6)
GLOBULIN SER CALC-MCNC: 3.7 G/DL
GLUCOSE SERPL-MCNC: 110 MG/DL (ref 65–100)
HCT VFR BLD AUTO: 43.5 % (ref 41.1–50.3)
HGB BLD-MCNC: 14.9 G/DL (ref 13.6–17.2)
IMM GRANULOCYTES # BLD AUTO: 0 K/UL (ref 0–0.5)
IMM GRANULOCYTES NFR BLD AUTO: 0 % (ref 0–5)
LYMPHOCYTES # BLD: 1.6 K/UL (ref 0.5–4.6)
LYMPHOCYTES NFR BLD: 27 % (ref 13–44)
MCH RBC QN AUTO: 31.9 PG (ref 26.1–32.9)
MCHC RBC AUTO-ENTMCNC: 34.3 G/DL (ref 31.4–35)
MCV RBC AUTO: 93.1 FL (ref 79.6–97.8)
MONOCYTES # BLD: 0.5 K/UL (ref 0.1–1.3)
MONOCYTES NFR BLD: 9 % (ref 4–12)
NEUTS SEG # BLD: 3.2 K/UL (ref 1.7–8.2)
NEUTS SEG NFR BLD: 55 % (ref 43–78)
NRBC # BLD: 0 K/UL (ref 0–0.2)
PLATELET # BLD AUTO: 190 K/UL (ref 150–450)
PMV BLD AUTO: 10.3 FL (ref 9.4–12.3)
POTASSIUM SERPL-SCNC: 4.2 MMOL/L (ref 3.5–5.1)
PROT SERPL-MCNC: 7.4 G/DL (ref 6.3–8.2)
RBC # BLD AUTO: 4.67 M/UL (ref 4.23–5.67)
SODIUM SERPL-SCNC: 140 MMOL/L (ref 136–145)
WBC # BLD AUTO: 5.8 K/UL (ref 4.3–11.1)

## 2019-03-27 PROCEDURE — 82378 CARCINOEMBRYONIC ANTIGEN: CPT

## 2019-03-27 PROCEDURE — 80053 COMPREHEN METABOLIC PANEL: CPT

## 2019-03-27 PROCEDURE — 85025 COMPLETE CBC W/AUTO DIFF WBC: CPT

## 2019-04-02 ENCOUNTER — HOSPITAL ENCOUNTER (OUTPATIENT)
Dept: GENERAL RADIOLOGY | Age: 69
Discharge: HOME | End: 2019-04-02
Attending: RADIOLOGY
Payer: MEDICARE

## 2019-04-02 ENCOUNTER — HOSPITAL ENCOUNTER (OUTPATIENT)
Dept: CT IMAGING | Age: 69
Discharge: HOME | End: 2019-04-02
Attending: INTERNAL MEDICINE
Payer: MEDICARE

## 2019-04-02 VITALS
DIASTOLIC BLOOD PRESSURE: 86 MMHG | BODY MASS INDEX: 26.88 KG/M2 | WEIGHT: 192 LBS | TEMPERATURE: 97.7 F | RESPIRATION RATE: 15 BRPM | OXYGEN SATURATION: 99 % | HEIGHT: 71 IN | SYSTOLIC BLOOD PRESSURE: 160 MMHG | HEART RATE: 49 BPM

## 2019-04-02 DIAGNOSIS — R91.8 MULTIPLE LUNG NODULES: ICD-10-CM

## 2019-04-02 DIAGNOSIS — C19 MALIGNANT NEOPLASM OF RECTOSIGMOID (COLON) (HCC): ICD-10-CM

## 2019-04-02 PROCEDURE — 88305 TISSUE EXAM BY PATHOLOGIST: CPT

## 2019-04-02 PROCEDURE — 74011250636 HC RX REV CODE- 250/636: Performed by: RADIOLOGY

## 2019-04-02 PROCEDURE — 71045 X-RAY EXAM CHEST 1 VIEW: CPT

## 2019-04-02 PROCEDURE — 99153 MOD SED SAME PHYS/QHP EA: CPT

## 2019-04-02 PROCEDURE — 99152 MOD SED SAME PHYS/QHP 5/>YRS: CPT

## 2019-04-02 PROCEDURE — 74011250636 HC RX REV CODE- 250/636

## 2019-04-02 PROCEDURE — 32405 CT BX LUNG NDL PERC: CPT

## 2019-04-02 PROCEDURE — 77030003560 HC NDL HUBR BARD -A

## 2019-04-02 RX ORDER — LIDOCAINE HYDROCHLORIDE 20 MG/ML
2-20 INJECTION, SOLUTION INFILTRATION; PERINEURAL ONCE
Status: COMPLETED | OUTPATIENT
Start: 2019-04-02 | End: 2019-04-02

## 2019-04-02 RX ORDER — IBUPROFEN 200 MG
600 TABLET ORAL
Status: DISCONTINUED | OUTPATIENT
Start: 2019-04-02 | End: 2019-04-06 | Stop reason: HOSPADM

## 2019-04-02 RX ORDER — MIDAZOLAM HYDROCHLORIDE 1 MG/ML
.5-2 INJECTION, SOLUTION INTRAMUSCULAR; INTRAVENOUS
Status: DISCONTINUED | OUTPATIENT
Start: 2019-04-02 | End: 2019-04-06 | Stop reason: HOSPADM

## 2019-04-02 RX ORDER — HEPARIN 100 UNIT/ML
500 SYRINGE INTRAVENOUS ONCE
Status: DISCONTINUED | OUTPATIENT
Start: 2019-04-02 | End: 2019-04-03 | Stop reason: HOSPADM

## 2019-04-02 RX ORDER — HEPARIN 100 UNIT/ML
500 SYRINGE INTRAVENOUS AS NEEDED
Status: DISCONTINUED | OUTPATIENT
Start: 2019-04-02 | End: 2019-04-06 | Stop reason: HOSPADM

## 2019-04-02 RX ORDER — DIPHENHYDRAMINE HYDROCHLORIDE 50 MG/ML
12.5-5 INJECTION, SOLUTION INTRAMUSCULAR; INTRAVENOUS ONCE
Status: COMPLETED | OUTPATIENT
Start: 2019-04-02 | End: 2019-04-02

## 2019-04-02 RX ORDER — SODIUM CHLORIDE 9 MG/ML
150 INJECTION, SOLUTION INTRAVENOUS CONTINUOUS
Status: DISCONTINUED | OUTPATIENT
Start: 2019-04-02 | End: 2019-04-03 | Stop reason: HOSPADM

## 2019-04-02 RX ORDER — MORPHINE SULFATE 10 MG/ML
1 INJECTION, SOLUTION INTRAMUSCULAR; INTRAVENOUS
Status: DISCONTINUED | OUTPATIENT
Start: 2019-04-02 | End: 2019-04-06 | Stop reason: HOSPADM

## 2019-04-02 RX ORDER — HYDROCODONE BITARTRATE AND ACETAMINOPHEN 7.5; 325 MG/1; MG/1
1 TABLET ORAL
Status: DISCONTINUED | OUTPATIENT
Start: 2019-04-02 | End: 2019-04-06 | Stop reason: HOSPADM

## 2019-04-02 RX ORDER — SODIUM CHLORIDE 9 MG/ML
25 INJECTION, SOLUTION INTRAVENOUS ONCE
Status: COMPLETED | OUTPATIENT
Start: 2019-04-02 | End: 2019-04-02

## 2019-04-02 RX ORDER — FENTANYL CITRATE 50 UG/ML
25-50 INJECTION, SOLUTION INTRAMUSCULAR; INTRAVENOUS
Status: DISCONTINUED | OUTPATIENT
Start: 2019-04-02 | End: 2019-04-06 | Stop reason: HOSPADM

## 2019-04-02 RX ADMIN — MIDAZOLAM HYDROCHLORIDE 1 MG: 1 INJECTION, SOLUTION INTRAMUSCULAR; INTRAVENOUS at 10:30

## 2019-04-02 RX ADMIN — LIDOCAINE HYDROCHLORIDE 10 ML: 20 INJECTION, SOLUTION INFILTRATION; PERINEURAL at 10:50

## 2019-04-02 RX ADMIN — FENTANYL CITRATE 50 MCG: 50 INJECTION, SOLUTION INTRAMUSCULAR; INTRAVENOUS at 10:29

## 2019-04-02 RX ADMIN — DIPHENHYDRAMINE HYDROCHLORIDE 50 MG: 50 INJECTION, SOLUTION INTRAMUSCULAR; INTRAVENOUS at 10:08

## 2019-04-02 RX ADMIN — MIDAZOLAM HYDROCHLORIDE 1 MG: 1 INJECTION, SOLUTION INTRAMUSCULAR; INTRAVENOUS at 10:21

## 2019-04-02 RX ADMIN — SODIUM CHLORIDE 25 ML/HR: 900 INJECTION, SOLUTION INTRAVENOUS at 10:19

## 2019-04-02 RX ADMIN — SODIUM CHLORIDE, PRESERVATIVE FREE 500 UNITS: 5 INJECTION INTRAVENOUS at 15:28

## 2019-04-02 NOTE — H&P
Department of Interventional Radiology  ([phone])    History and Physical    Patient:  Natasha Dunn MRN:  [id number]  SSN:  [id number]    YOB: 1950  Age:  80 y.o. Sex:  male      Primary Care Provider:  Haylee Oleary DO  Referring Physician:  Radha Ayala MD    Subjective:     Chief Complaint: Metastatic colorectal cancer. History of the Present Illness: The patient is a 80 y.o. male who presents with a multiple pulmonary nodules. NPO. No thinners. Past Medical History:   Diagnosis Date    Allergic rhinitis     BPH (benign prostatic hyperplasia) 6/6/16    per pt- used to be on medication- off due to expense    Gastrointestinal disorder     ulcers    GERD (gastroesophageal reflux disease)     occ-- no meds    Malignant neoplasm of rectosigmoid (colon) ([address] 2016    surg and chemo    PUD (peptic ulcer disease) yrs ago    Bleeding ulcer    Stool color black     Constipation, hemrrhoids     Past Surgical History:   Procedure Laterality Date    COLONOSCOPY N/A 6/10/2016    COLON[facility]COPY performed by Jennifer Nowak MD at [address] [facility] COLONOSCOPY N/A 3/13/2017    Repeat in 3 years; COLONOSCOPY/ 27 performed by Juan Antonio Pérez MD at [address] [place] [address] N/A 6/13/2016    SIGMOIDOSCOPY FLEXIBLE [facility] ENDOSCOPY    HX GI  2016    Laparoscopic-assisted sigmoid colectomy with laparoscopic    HX VASCULAR ACCESS      port placed--left    CT COLONOSCOPY FLX DX W/COLLJ SPEC WHEN PFRMD  03/13/2017    Dr. Oziel Vee in 3 years. Review of Systems:    Pertinent items are noted in the History of Present Illness. Current Outpatient Medications   Medication Sig    cholecalciferol, vitamin D3, (VITAMIN D3) 2,000 unit tab Take  by mouth.  aspirin delayed-release 81 mg tablet Take  by mouth daily.  lidocaine-prilocaine (EMLA) topical cream Apply  to affected area as needed for Pain.     pantoprazole (PROTONIX) 40 mg tablet Take 1 Tab by mouth daily as needed.  polyethylene glycol (MIRALAX) 17 gram/dose powder Take 17 g by mouth daily. Current Facility-Administered Medications   Medication Dose Route Frequency    heparin (porcine) pf 500 Units  500 Units InterCATHeter PRN    0.9% sodium chloride infusion  25 mL/hr IntraVENous ONCE    midazolam (VERSED) injection 0.5-2 mg  0.5-2 mg IntraVENous Multiple    fentaNYL citrate (PF) injection 25-50 mcg  25-50 mcg IntraVENous Multiple    diphenhydrAMINE (BENADRYL) injection 12.5-50 mg  12.5-50 mg IntraVENous ONCE    lidocaine (XYLOCAINE) 20 mg/mL (2 %) injection  mg  2-20 mL SubCUTAneous ONCE        Allergies   Allergen Reactions    No Known Allergies Other (comments)       Family History   Problem Relation Age of Onset    Cancer Brother     Colon Cancer Neg Hx      Social History     Tobacco Use    Smoking status: Never Smoker    Smokeless tobacco: Never Used   Substance Use Topics    Alcohol use: No     Alcohol/week: 0.0 oz        Objective:       Physical Examination:    Vitals:    04/02/19 0935 04/02/19 0952   BP: 158/80    Pulse: (!) 54    Resp: 18    Temp: 97.7 °F (36.5 °C)    SpO2: 97%    Weight:  87.1 kg (192 lb)   Height:  5' 11\" (1.803 m)     Blood pressure 158/80, pulse (!) 54, temperature 97.7 °F (36.5 °C), resp. rate 18, height 5' 11\" (1.803 m), weight 87.1 kg (192 lb), SpO2 97 %. General:  alert, cooperative, no distress  Heart:  regular rate and rhythm, S1, S2 normal, no murmur, click, rub or gallop  Lungs:  clear to auscultation bilaterally. Left chest port.    Abdomen:  soft, nondistended, normal bowel sounds, nontender  Neuro:  normal without focal findings    Laboratory:     Lab Results   Component Value Date/Time    Sodium 140 03/27/2019 12:42 PM    Sodium 137 09/27/2018 12:05 PM    Potassium 4.2 03/27/2019 12:42 PM    Potassium 4.0 09/27/2018 12:05 PM    Chloride 104 03/27/2019 12:42 PM    Chloride 108 (H) 09/27/2018 12:05 PM    CO2 32 03/27/2019 12:42 PM    CO2 30 09/27/2018 12:05 PM    Anion gap 4 03/27/2019 12:42 PM    Anion gap Cannot be calculated 09/27/2018 12:05 PM    Glucose 110 (H) 03/27/2019 12:42 PM    Glucose 98 09/27/2018 12:05 PM    BUN 12 03/27/2019 12:42 PM    BUN 14 09/27/2018 12:05 PM    Creatinine 0.90 03/27/2019 12:42 PM    Creatinine 0.98 09/27/2018 12:05 PM    GFR est AA >60 03/27/2019 12:42 PM    GFR est AA >60 09/27/2018 12:05 PM    GFR est non-AA >60 03/27/2019 12:42 PM    GFR est non-AA >60 09/27/2018 12:05 PM    Calcium 9.3 03/27/2019 12:42 PM    Calcium 8.6 09/27/2018 12:05 PM    Magnesium 2.2 05/31/2017 10:47 AM    Magnesium 2.1 02/28/2017 11:00 AM    Albumin 3.7 03/27/2019 12:42 PM    Albumin 3.8 09/27/2018 12:05 PM    Protein, total 7.4 03/27/2019 12:42 PM    Protein, total 7.0 09/27/2018 12:05 PM    Globulin 3.7 03/27/2019 12:42 PM    Globulin 3.2 09/27/2018 12:05 PM    A-G Ratio 1.0 03/27/2019 12:42 PM    A-G Ratio 1.2 09/27/2018 12:05 PM    AST (SGOT) 21 03/27/2019 12:42 PM    AST (SGOT) 14 (L) 09/27/2018 12:05 PM    ALT (SGPT) 23 03/27/2019 12:42 PM    ALT (SGPT) 24 09/27/2018 12:05 PM     Lab Results   Component Value Date/Time    WBC 5.8 03/27/2019 12:42 PM    WBC 5.0 09/27/2018 12:05 PM    HGB 14.9 03/27/2019 12:42 PM    HGB 14.4 09/27/2018 12:05 PM    HCT 43.5 03/27/2019 12:42 PM    HCT 41.5 09/27/2018 12:05 PM    PLATELET 941 73/13/0695 12:42 PM    PLATELET 553 (L) 04/81/1537 12:05 PM     No results found for: APTT, PTP, INR    Assessment:     Multiple pulmonary nodules. Plan:     Planned Procedure:  CT guided lung nodule biopsy. Sedation. Risks, benefits, and alternatives reviewed with patient and he agrees to proceed with the procedure.       Signed By: Grady Disla MD     April 2, 2019

## 2019-04-02 NOTE — PROCEDURES
Department of Interventional Radiology  [phone]        Interventional Radiology Brief Procedure Note    Patient: Franc Alba MRN: [id number]  SSN: [id number]    YOB: 1950  Age: 80 y.o. Sex: male      Date of Procedure: 4/2/2019    Pre-Procedure Diagnosis: Metastatic colorectal cancer. Post-Procedure Diagnosis: SAME    Procedure(s): Image Guided Biopsy    Brief Description of Procedure: Left Lung, Superior Segment, Lower Lobe    Performed By: Danette Arriaga MD     Assistants: None    Anesthesia:Moderate Sedation    Estimated Blood Loss: Less than 10ml    Specimens:  Pathology    Implants:  None    Findings: Tiny pneumothorax. Complications: None    Recommendations: Oxygen. NPO. CXR in 1 hour.       Follow Up: CXR    Signed By: Danette Arriaga MD     April 2, 2019

## 2019-04-02 NOTE — PROGRESS NOTES
Repeat CXR shows just a sliver of L PTX. Utilizing an , I explained this finding, and its stability over several hours, to the patient. I offered an opportunity to ask questions. I offered to have him stay in the hospital for additional monitoring, or to go home and return for evaluation tomorrow. I believe that he is stable for discharge. He agrees, and would like to go home now. He will be NPO p MN and return for a CXR tomorrow. This will allow the option of chest tube placement if needed. He is very comfortable with this plan.       Kellie Encarnacion MD

## 2019-04-02 NOTE — PROGRESS NOTES
present at bedside with Dr. Rachel Elkins and discharge instructions with unit nurse. Miguel Rankin Marylen Saras  Patient [email or web address]  c: [phone] / Margarita Raymond [address] / [place], [facility]  [email or web address]

## 2019-04-02 NOTE — PROGRESS NOTES
TRANSFER - OUT REPORT:    Verbal report given to [facility], RN (name) on [facility]  being transferred to IR Recovery (unit) for routine post - op       Report consisted of patients Situation, Background, Assessment and   Recommendations(SBAR). Information from the following report(s) SBAR, Kardex, Procedure Summary and MAR was reviewed with the receiving nurse. Lines:   Venous Access Device Port a cath 08/17/16 Upper chest (subclavicular area), left (Active)   Dressing Status Clean, dry, & intact; New 4/2/2019 10:01 AM   Dressing Type Disk with Chlorhexadine gluconate (CHG); Transparent 4/2/2019 10:01 AM   Date Accessed (Medial Site) 04/02/19 4/2/2019 10:01 AM   Access Time (Medial Site) 0955 4/2/2019 10:01 AM   Access Needle Size (Site #1) 20 G 4/2/2019 10:01 AM   Access Needle Length (Medial Site) 0.75 inches 4/2/2019 10:01 AM   Positive Blood Return (Medial Site) Yes 4/2/2019 10:01 AM   Action Taken (Medial Site) Flushed 4/2/2019 10:01 AM        Opportunity for questions and clarification was provided. Reported that patient is to maintain his NPO Status, Remain on Oxygen; CXR Single View at 1145 per VO from Dr. Allie Enriquez.

## 2019-04-02 NOTE — PROGRESS NOTES
Discharge complete. Discharge instructions reviewed with pt using . Pt instructed to come back tomorrow at 8am for chest xray and given written order. Pt instructed to bring order to xray. Time for questions allowed. Pt denies any questions at this time. Dressing to back noted to be clean, dry, and intact. Pt assisted to front of hospital via wheelchair.      Visit Vitals  /86   Pulse (!) 49   Temp 97.7 °F (36.5 °C)   Resp 15   Ht 5' 11\" (1.803 m)   Wt 87.1 kg (192 lb)   SpO2 93%   BMI 26.78 kg/m²

## 2019-04-02 NOTE — PROGRESS NOTES
present in registration and at bedside during assessment with unit nurse and Dr. Lady Flynn, signature of consent for procedure. Coolidge Curling [facility] Ra[facility] President Halie Eden  Patient [email or web address]  c: [phone] / Margy [facility] [address] / Ha, [address] [facility]  [email or web address]

## 2019-04-02 NOTE — DISCHARGE INSTRUCTIONS
Patient Education        Debora Blake de pulmón: Jonathan Mary en el [facility] - [ Percutaneous Lung Biopsy: What to Expect at Home ]  Ge recuperación    Karthikeyan biopsia percutánea de pulmón es un procedimiento para daniela karthikeyan muestra (biopsia) de tejido del pulmón. Para daniela la [place], el médico inserta karthikeyan [place] a través de la pared del pecho. Otro médico examinará el tejido del pulmón con un microscopio para verificar la presencia de alguna infección, cáncer o algún otro problema en el pulmón. Coty procedimiento también es conocido vnaessa biopsia por punción. Es posible que esté adolorida la michele en donde el médico hizo el ghazal (incisión) en ge piel e insertó la aguja de biopsia. Podría sentir algún dolor en ge pulmón cuando respira profundamente. Estos síntomas suelen mejorar en unos días. Si al toser expulsa mucosidad (flema), es posible que observe rastros de kami en la mucosidad bryson la primera semana después del procedimiento. Es posible que necesite reposar en ge hogar [address] del procedimiento. Byrson 1 semana, trate de evitar levantar objetos pesados y hacer actividades extenuantes. Estas actividades podrían provocarle sangrado en el lugar donde se hizo la biopsia. Puede tardar varios días en [employer] de la biopsia. El médico o el enfermero(a) hablará con usted acerca de los [place]. Esta hoja de [employer] idea general de cuánto tiempo tardará en recuperarse. Sin embargo, cada persona se recupera a un ritmo diferente. Siga los pasos siguientes para sentirse mejor valencia pronto vanessa sea posible. ¿Cómo puede cuidarse en el [facility]? Actividad    · Descanse cuando se sienta fatigado. Dormir lo suficiente le ayudará a recuperarse.     · Intente caminar todos los días. Comience caminando un poco más de lo que caminó el día anterior. Poco a poco, aumente la distancia.  Caminar aumenta el flujo de [place] y [place] a prevenir la neumonía y el estreñimiento.     · Evite actividades extenuantes, vanessa montar en bicicleta, trotar, levantar pesas o hacer ejercicios aeróbicos, bryson 1 semana o hasta que ge médico lo apruebe.     · Bryson 1 semana, evite levantar objetos que pudieran implicar un esfuerzo. [place] podría incluir un kiera, bolsas de compras pesadas y recipientes para [place] maletines pesados, bolsas de arena para excrementos de tristan o alimentos para ventura, o karthikeyan aspiradora.     · Pregúntele a ge médico cuándo puede volver a conducir.     · Es posible que necesite ausentarse del trabajo bryson 1 ó 2 días. [place] dependerá del tipo de trabajo que bob y cómo se sienta.     · Puede ducharse 1 ó 2 días después del procedimiento, si ge médico lo aprueba. Seque la herida con toques suaves de toalla. No tome ben de inmersión en karthikeyan jerry bryson la primera semana después del procedimiento o hasta que ge médico lo apruebe.     · No viaje en avión ni se sumerja profundo, vanessa en la práctica de buceo, hasta que ge médico se lo permita. Evite toda situación en donde haya un aumento de la presión de aire.    Dieta    · Puede continuar con ge dieta normal. Si tiene [employer], coma alimentos blandos bajos en grasa, vanessa arroz sin condimentar, doretha a la sam, pan bertha y yogur. Medicamentos    · Ge médico le dirá si puede volver a daniela cindy medicamentos y cuándo puede volver a hacerlo. También le dará indicaciones sobre cualquier medicamento nuevo que deba daniela usted.     · Si tamiko medicamentos que previenen la formación de coágulos de [place], vanessa warfarina (Coumadin), clopidogrel (Plavix) o aspirina, asegúrese de hablar con ge médico. Él o deric le dirá si debe volver a daniela estos medicamentos y en qué momento. Asegúrese de que entiende exactamente lo que el médico quiere que bob.     · Sea jacob con los medicamentos. [place] los analgésicos (medicamentos para el dolor) exactamente vanessa le fueron indicados.   ? Si el médico le recetó un analgésico, tómelo según las indicaciones. ? Si no está tomando un analgésico recetado, pregúntele a ge médico si puede daniela kash de [employer].     · Si le parece que el analgésico le está produciendo revoltura del estómago:  ? [place] el medicamento después de las comidas (a menos que ge médico le haya indicado lo contrario). ? [place] al médico un analgésico diferente.     · Si ge médico le recetó antibióticos, tómelos según las indicaciones. No deje de tomarlos por el hecho de sentirse mejor. Debe daniela todos los antibióticos hasta terminarlos.    Cuidado de la herida    · Si tiene tiras de cinta adhesiva sobre la herida, déjeselas puestas karthikeyan semana o hasta que se despeguen por sí solas.     · Lave diariamente la michele con Julio Yadira tibia y séquela con toques suaves de toalla. No use peróxido de hidrógeno [place]) o alcohol porque pueden retrasar la sanación. Podría cubrir la michele con un vendaje de gasa si supura o roza contra la ropa. Cambie el vendaje todos los días.     · Mantenga la michele limpia y [place] Pafos. La atención de seguimiento es karthikeyan parte clave de ge tratamiento y seguridad. Asegúrese de hacer y acudir a todas las citas, y llame a ge médico si está teniendo problemas. También es karthikeyan buena idea saber los resultados de los exámenes y mantener karthikeyan lista de los medicamentos que tamiko. ¿Cuándo debe pedir ayuda? Llame al 911 en cualquier momento que considere que necesita atención de emergencia. Por ejemplo, llame si:    · Se desmayó (perdió el conocimiento).     · Tiene graves dificultades para respirar.     · Tiene dolor repentino en el pecho y falta de [place], o tose kami.    Llame a ge médico ahora mismo o busque atención médica inmediata si:    · Siente el estómago revuelto o no puede retener líquidos en el estómago.     · Tiene dolor que no mejora después de daniela analgésicos.     · Tiene fiebre de más de 100°F (37.8°C).     · Tiene señales de infección, tales vanessa:  ?  Aumento del dolor, la hinchazón, el enrojecimiento o la temperatura. ? Vetas rojizas que salen de la herida. ? Pus que supura de la herida. ? Ganglios linfáticos inflamados en el nikole, las axilas o la paulina. ? [place].     · El vendaje sobre la herida está empapado de kami de color vega vivo.     · Al toser expectora mucha más mucosidad (flema) que de costumbre o la mucosidad cambia de color.    Preste especial atención a los cambios en ge carol y asegúrese de comunicarse con ge médico si tiene algún problema. ¿Dónde puede encontrar más información en inglés? [place] a [email or web address] Ralph Butts [address] en la búsqueda para aprender más acerca de \"Biopsia percutánea de pulmón: Aleja Wayne en el hogar - [ Percutaneous Lung Biopsy: What to Expect at Home ]. \"  Revisado: 5 septiembre, 2018  Versión del contenido: 11.9  © 5980-5147 Healthwise, Incorporated. Las instrucciones de cuidado fueron adaptadas bajo licencia por [employer] (which disclaims liability or warranty for this information). Si usted tiene [place] afección médica o sobre estas instrucciones, siempre pregunte a ge profesional de carol. Healthwise, Incorporated niega toda garantía o responsabilidad por ge uso de esta información. Si tiene Preguntas acerca del procedimiento, por favor llame al departamento de Radiología Intervencional al [phone]. Después de horas de oficina (5 pm) y los fines de [place], llamar al Laye Antonio de llamadas al [phone] y pregunte por el Radiologo de [facility]. Interventional Radiology General Nurse Discharge    After general anesthesia or intravenous sedation, for 24 hours or while taking prescription Narcotics:  · Limit your activities  · Do not drive and operate hazardous machinery  · Do not make important personal or business decisions  · Do  not drink alcoholic beverages  · If you have not urinated within 8 hours after discharge, please contact your surgeon on call.     * Please give a list of your current medications to your Primary Care Provider. * Please update this list whenever your medications are discontinued, doses are     changed, or new medications (including over-the-counter products) are added. * Please carry medication information at all times in case of emergency situations. These are general instructions for a healthy lifestyle:    No smoking/ No tobacco products/ Avoid exposure to second hand smoke  Surgeon General's Warning:  Quitting smoking now greatly reduces serious risk to your health. Obesity, smoking, and sedentary lifestyle greatly increases your risk for illness  A healthy diet, regular physical exercise & weight monitoring are important for maintaining a healthy lifestyle    You may be retaining fluid if you have a history of heart failure or if you experience any of the following symptoms:  Weight gain of 3 pounds or more overnight or 5 pounds in a week, increased swelling in our hands or feet or shortness of breath while lying flat in bed. Please call your doctor as soon as you notice any of these symptoms; do not wait until your next office visit. Recognize signs and symptoms of STROKE:  F-face looks uneven    A-arms unable to move or move unevenly    S-speech slurred or non-existent    T-time-call 911 as soon as signs and symptoms begin-DO NOT go       Back to bed or wait to see if you get better-TIME IS BRAIN.

## 2019-04-02 NOTE — PROGRESS NOTES
TRANSFER - IN REPORT:    Verbal report received from Chip Birmingham RN(name) on [facility]  being received from IR CT(unit) for routine progression of care      Report consisted of patients Situation, Background, Assessment and   Recommendations(SBAR). Information from the following report(s) Procedure Summary and MAR was reviewed with the receiving nurse. Opportunity for questions and clarification was provided. Assessment completed upon patients arrival to unit and care assumed.

## 2019-04-02 NOTE — PROGRESS NOTES
Minimal, if any, left PTX. Will check another CXR in about 2.5 hours. Continue Oxygen, NPO.     Mya Parikh MD

## 2019-04-03 ENCOUNTER — HOSPITAL ENCOUNTER (OUTPATIENT)
Dept: GENERAL RADIOLOGY | Age: 69
Discharge: HOME | End: 2019-04-03
Payer: MEDICARE

## 2019-04-03 DIAGNOSIS — J93.9 PNEUMOTHORAX ON LEFT: ICD-10-CM

## 2019-04-03 PROCEDURE — 71045 X-RAY EXAM CHEST 1 VIEW: CPT

## 2019-04-23 ENCOUNTER — HOSPITAL ENCOUNTER (OUTPATIENT)
Dept: LAB | Age: 69
Discharge: HOME | End: 2019-04-23
Payer: MEDICARE

## 2019-04-23 DIAGNOSIS — C19 MALIGNANT NEOPLASM OF RECTOSIGMOID (COLON) (HCC): ICD-10-CM

## 2019-04-23 LAB
ALBUMIN SERPL-MCNC: 3.9 G/DL (ref 3.2–4.6)
ALBUMIN/GLOB SERPL: 1.2 {RATIO} (ref 1.2–3.5)
ALP SERPL-CCNC: 91 U/L (ref 50–136)
ALT SERPL-CCNC: 24 U/L (ref 12–65)
ANION GAP SERPL CALC-SCNC: 5 MMOL/L (ref 7–16)
AST SERPL-CCNC: 16 U/L (ref 15–37)
BASOPHILS # BLD: 0 K/UL (ref 0–0.2)
BASOPHILS NFR BLD: 1 % (ref 0–2)
BILIRUB SERPL-MCNC: 0.3 MG/DL (ref 0.2–1.1)
BUN SERPL-MCNC: 17 MG/DL (ref 8–23)
CALCIUM SERPL-MCNC: 9 MG/DL (ref 8.3–10.4)
CEA SERPL-MCNC: 22.4 NG/ML (ref 0–3)
CHLORIDE SERPL-SCNC: 106 MMOL/L (ref 98–107)
CO2 SERPL-SCNC: 29 MMOL/L (ref 21–32)
CREAT SERPL-MCNC: 0.88 MG/DL (ref 0.8–1.5)
DIFFERENTIAL METHOD BLD: NORMAL
EOSINOPHIL # BLD: 0.2 K/UL (ref 0–0.8)
EOSINOPHIL NFR BLD: 5 % (ref 0.5–7.8)
ERYTHROCYTE [DISTWIDTH] IN BLOOD BY AUTOMATED COUNT: 12.9 % (ref 11.9–14.6)
GLOBULIN SER CALC-MCNC: 3.3 G/DL (ref 2.3–3.5)
GLUCOSE SERPL-MCNC: 96 MG/DL (ref 65–100)
HCT VFR BLD AUTO: 43.5 % (ref 41.1–50.3)
HGB BLD-MCNC: 15 G/DL (ref 13.6–17.2)
IMM GRANULOCYTES # BLD AUTO: 0 K/UL (ref 0–0.5)
IMM GRANULOCYTES NFR BLD AUTO: 0 % (ref 0–5)
LYMPHOCYTES # BLD: 1.2 K/UL (ref 0.5–4.6)
LYMPHOCYTES NFR BLD: 24 % (ref 13–44)
MCH RBC QN AUTO: 31.9 PG (ref 26.1–32.9)
MCHC RBC AUTO-ENTMCNC: 34.5 G/DL (ref 31.4–35)
MCV RBC AUTO: 92.6 FL (ref 79.6–97.8)
MONOCYTES # BLD: 0.4 K/UL (ref 0.1–1.3)
MONOCYTES NFR BLD: 8 % (ref 4–12)
NEUTS SEG # BLD: 3.2 K/UL (ref 1.7–8.2)
NEUTS SEG NFR BLD: 63 % (ref 43–78)
NRBC # BLD: 0 K/UL (ref 0–0.2)
PLATELET # BLD AUTO: 166 K/UL (ref 150–450)
PMV BLD AUTO: 10.4 FL (ref 9.4–12.3)
POTASSIUM SERPL-SCNC: 4 MMOL/L (ref 3.5–5.1)
PROT SERPL-MCNC: 7.2 G/DL (ref 6.3–8.2)
RBC # BLD AUTO: 4.7 M/UL (ref 4.23–5.67)
SODIUM SERPL-SCNC: 140 MMOL/L (ref 136–145)
WBC # BLD AUTO: 5 K/UL (ref 4.3–11.1)

## 2019-04-23 PROCEDURE — 85025 COMPLETE CBC W/AUTO DIFF WBC: CPT

## 2019-04-23 PROCEDURE — 80053 COMPREHEN METABOLIC PANEL: CPT

## 2019-04-23 PROCEDURE — 82378 CARCINOEMBRYONIC ANTIGEN: CPT

## 2019-05-02 ENCOUNTER — HOSPITAL ENCOUNTER (OUTPATIENT)
Dept: CT IMAGING | Age: 69
Discharge: HOME | End: 2019-05-02
Attending: INTERNAL MEDICINE
Payer: MEDICARE

## 2019-05-02 DIAGNOSIS — R91.8 LUNG NODULES: ICD-10-CM

## 2019-05-02 PROCEDURE — 71250 CT THORAX DX C-: CPT

## 2019-05-03 PROBLEM — R91.8 PULMONARY NODULES: Status: ACTIVE | Noted: 2019-05-03

## 2019-05-03 NOTE — H&P (VIEW-ONLY)
[address] [place]. [address] [facility], [address] [facility] 
[phone] Patient Name:  Anjum Deutsch YOB: 1950 Office Visit 5/3/2019 CHIEF COMPLAINT:   
Chief Complaint Patient presents with  Lung Nodule HISTORY OF PRESENT ILLNESS:   
I had the pleasure of seeing Mr. Zenaida Vargas in our clinic today. Mr. Randy Riley is a 77 y.o. male who presents with a history of colon cancer diagnosed in 2016 status post left colectomy, stage II, status post FOLFOX. Surveillance CT scans have shown gradually increasing bilateral pulmonary nodules. PET scan in March 2019 showed one nodule in particular in the left lower lobe was large and PET avid. He was sent for a CT-guided biopsy of this nodule which was nondiagnostic. Therefore he was referred to us to consider bronchoscopic biopsy of this lesion. Patient denies any significant symptoms. He states he has some allergic rhinitis and subsequent cough but denies any pulmonary issues. Specifically denies any hemoptysis, fevers, chills, unintentional weight loss, shortness of breath, chest pain, or lymphadenopathy. He does not have any history of pulmonary problems other than the known pulmonary nodules. He is a never smoker and denies any inhaled exposures. Past Medical History:  
Diagnosis Date  Allergic rhinitis  BPH (benign prostatic hyperplasia) 6/6/16  
 per pt- used to be on medication- off due to expense  Gastrointestinal disorder   
 ulcers  GERD (gastroesophageal reflux disease) occ-- no meds  Malignant neoplasm of rectosigmoid (colon) ([facility] [address] 2016  
 surg and chemo  PUD (peptic ulcer disease) yrs ago Bleeding ulcer  Stool color black Constipation, hemrrhoids Problem List  Date Reviewed: 5/3/2019 Codes Class Noted Pulmonary nodules ICD-10-CM: R91.8 ICD-9-CM: 793.19  5/3/2019  Malignant neoplasm of rectosigmoid (colon) (HCC) ICD-10-CM: C19 
 ICD-9-CM: 154.0  6/14/2016 Biliary gastritis ICD-10-CM: K29.60 ICD-9-CM: 535.40  6/14/2016 Colonic obstruction ([facility] [address] ICD-10-CM: M91.355 ICD-9-CM: 560.9  6/10/2016 Allergic rhinitis ICD-10-CM: J30.9 ICD-9-CM: 477.9  Unknown Past Surgical History:  
Procedure Laterality Date  COLONOSCOPY N/A 6/10/2016 COLONOSCOPY performed by Marie Restrepo MD at [facility] ENDOSCOPY  COLONOSCOPY N/A 3/13/2017 Repeat in 3 years; COLONOSCOPY/ 27 performed by Glenys Montague MD at [facility] ENDOSCOPY 2021 Bia Samuel Nydia N/A 6/13/2016 SIGMOIDOSCOPY FLEXIBLE performed by Marie Restrepo MD at [facility] GI  2016 Laparoscopic-assisted sigmoid colectomy with laparoscopic  HX VASCULAR ACCESS    
 port placed--left  TX COLONOSCOPY FLX DX W/COLLJ SPEC WHEN PFRMD  03/13/2017 Dr. Juan Jeong in 3 years. No flowsheet data found. Social History Socioeconomic History  Marital status:  Spouse name: Not on file  Number of children: Not on file  Years of education: Not on file  Highest education level: Not on file Occupational History  Not on file Social Needs  Financial resource strain: Not on file  Food insecurity:  
  Worry: Not on file Inability: Not on file  Transportation needs:  
  Medical: Not on file Non-medical: Not on file Tobacco Use  Smoking status: Never Smoker  Smokeless tobacco: Never Used Substance and Sexual Activity  Alcohol use: No  
  Alcohol/week: 0.0 oz  Drug use: No  
 Sexual activity: Not on file Lifestyle  Physical activity:  
  Days per week: Not on file Minutes per session: Not on file  Stress: Not on file Relationships  Social connections:  
  Talks on phone: Not on file Gets together: Not on file Attends [employer] service: Not on file Active member of club or organization: Not on file Attends meetings of clubs or organizations: Not on file Relationship status: Not on file  Intimate partner violence:  
  Fear of current or ex partner: Not on file Emotionally abused: Not on file Physically abused: Not on file Forced sexual activity: Not on file Other Topics Concern  Not on file Social History Narrative  Not on file Family History Problem Relation Age of Onset  Cancer Brother  Colon Cancer Neg Hx Allergies Allergen Reactions  No Known Allergies Other (comments) Current Outpatient Medications Medication Sig  cholecalciferol, vitamin D3, (VITAMIN D3) 2,000 unit tab Take  by mouth.  aspirin delayed-release 81 mg tablet Take  by mouth daily.  polyethylene glycol (MIRALAX) 17 gram/dose powder Take 17 g by mouth daily. No current facility-administered medications for this visit. Review of Systems Respiratory: Positive for cough. Genitourinary: Positive for frequency. Musculoskeletal: Positive for neck pain. Endo/Heme/Allergies: Positive for environmental allergies. PHYSICAL EXAM: 
Visit Vitals /78 (BP 1 Location: Left arm, BP Patient Position: Sitting) Pulse 60 Temp 99.3 °F (37.4 °C) (Temporal) Resp 18 Ht 5' 10\" (1.778 m) Wt 186 lb 4.8 oz (84.5 kg) SpO2 97% BMI 26.73 kg/m² GENERAL APPEARANCE: 
The patient is normal weight and in no respiratory distress. HEENT: 
PERRL. Conjunctivae unremarkable. Nasal mucosa is without epistaxis, exudate, or polyps. Gums and dentition are unremarkable. There is no oropharyngeal narrowing. NECK/LYMPHATIC: 
Symmetrical with no elevation of jugular venous pulsation. Trachea midline. No thyroid enlargement. No cervical adenopathy. LUNGS: 
Normal respiratory effort with symmetrical lung expansion. Breath sounds are clear to auscultation bilaterally. HEART: 
There is a regular rate and rhythm. No murmur, rub, or gallop.    
ABDOMEN: 
 Soft and non-tender. No hepatosplenomegaly. Bowel sounds are normal.   
NEURO/PSYCH: 
The patient is alert and oriented to person, place, and time. Memory appears intact and mood is normal.  No gross sensorimotor deficits are present. MS/SKIN: 
There is no edema in the lower extremities. No rashes, bruises, lesions, ulcers visible. DIAGNOSTIC TESTS: 
CT of chest:   
Results from Hospital Encounter encounter on 05/02/19 CT CHEST WO CONT Results for orders placed during the hospital encounter of 05/02/19 CT CHEST WO CONT Narrative A CT Chest WO Contrast was performed. These images are for navigational purposes. These images were not submitted for 
interpretation. Results for orders placed during the hospital encounter of 07/18/16 CT CHEST W CONT Narrative CT OF THE CHEST WITH INTRAVENOUS CONTRAST. INDICATION: Colon cancer, T3 N0M0, C19. Chest pain. COMPARISON: None. TECHNIQUE:   2.5 mm axial scans from above the aortic arch to the lung bases 
with 80 cc nonionic intravenous contrast without acute complication. Intravenous contrast was given to evaluate for pulmonary embolism. FINDINGS: 3 mm nodule right upper lobe adjacent to the minor fissure. No other 
pulmonary nodules. No pulmonary masses. No airspace disease or pleural effusion. No grossly enlarged axillary, hilar or mediastinal lymph nodes. No gross bony 
lesions. There are cholecystectomy clips in the right upper quadrant. Simple 
cyst right kidney. .  Included portion of the upper abdomen is unremarkable. Impression IMPRESSION:  Solitary 3 mm pulmonary nodule left upper lobe. This can be 
followed. Otherwise unremarkable. No results found for this or any previous visit. CXR:   
No results found for this or any previous visit. PET/CT:  
Results from Hospital Encounter encounter on 03/21/19 PET/CT TUMOR IMAGE SKULL THIGH (SUB)  Impression IMPRESSION:  
 1. Increased number and size bilateral metastatic pulmonary nodules. These 
nodules are FDG avid as outlined above. 2. No FDG avid sided disease outside of the chest.  
 
No results found for this or any previous visit. Exercise oximetry:   
 
Spirometry:  
Date:   05/3/19 FVC    3.4 -76% FEV1    2.6- 76% FEV1/FVC    76% FEF 25-75%    2.3-78% Bronchodilator Response TLC           
RV           
DLCO Echo: 
No results found for this or any previous visit. ASSESSMENT:  (Medical Decision Making) ICD-10-CM ICD-9-CM 1. Cough R05 786.2 AMB POC SPIROMETRY 2. Malignant neoplasm of rectosigmoid (colon) (HCC) C19 154.0 Pt with history of colon cancer with enlarging B pulmonary nodules. LLL in particular is large and PET avid. CT guided biopsy was non diagnostic. Oncology requesting navigation bronchoscopy with biopsy. PLAN: 
-will proceed with bronchoscopy with attempt at biopsy. -reviewed risks of procedure with patient. Portions of this note were created using voice recognition software. As such, error of speech recognition may have occurred. Orders Placed This Encounter  AMB POC SPIROMETRY Gabriel Taveras MD 
Electronically signed

## 2019-05-07 ENCOUNTER — APPOINTMENT (OUTPATIENT)
Dept: GENERAL RADIOLOGY | Age: 69
End: 2019-05-07
Attending: INTERNAL MEDICINE
Payer: MEDICARE

## 2019-05-07 ENCOUNTER — HOSPITAL ENCOUNTER (OUTPATIENT)
Age: 69
Setting detail: OUTPATIENT SURGERY
Discharge: HOME | End: 2019-05-07
Attending: INTERNAL MEDICINE | Admitting: INTERNAL MEDICINE
Payer: MEDICARE

## 2019-05-07 VITALS
TEMPERATURE: 98 F | WEIGHT: 188 LBS | HEIGHT: 71 IN | BODY MASS INDEX: 26.32 KG/M2 | DIASTOLIC BLOOD PRESSURE: 78 MMHG | SYSTOLIC BLOOD PRESSURE: 114 MMHG | OXYGEN SATURATION: 96 % | HEART RATE: 54 BPM | RESPIRATION RATE: 18 BRPM

## 2019-05-07 DIAGNOSIS — R91.8 LUNG MASS: ICD-10-CM

## 2019-05-07 PROCEDURE — 87106 FUNGI IDENTIFICATION YEAST: CPT

## 2019-05-07 PROCEDURE — 88342 IMHCHEM/IMCYTCHM 1ST ANTB: CPT

## 2019-05-07 PROCEDURE — 99152 MOD SED SAME PHYS/QHP 5/>YRS: CPT | Performed by: INTERNAL MEDICINE

## 2019-05-07 PROCEDURE — 31628 BRONCHOSCOPY/LUNG BX EACH: CPT | Performed by: INTERNAL MEDICINE

## 2019-05-07 PROCEDURE — 87102 FUNGUS ISOLATION CULTURE: CPT

## 2019-05-07 PROCEDURE — 31624 DX BRONCHOSCOPE/LAVAGE: CPT | Performed by: INTERNAL MEDICINE

## 2019-05-07 PROCEDURE — 88360 TUMOR IMMUNOHISTOCHEM/MANUAL: CPT

## 2019-05-07 PROCEDURE — 31623 DX BRONCHOSCOPE/BRUSH: CPT | Performed by: INTERNAL MEDICINE

## 2019-05-07 PROCEDURE — 87070 CULTURE OTHR SPECIMN AEROBIC: CPT

## 2019-05-07 PROCEDURE — 74011000250 HC RX REV CODE- 250: Performed by: INTERNAL MEDICINE

## 2019-05-07 PROCEDURE — 88305 TISSUE EXAM BY PATHOLOGIST: CPT

## 2019-05-07 PROCEDURE — 88341 IMHCHEM/IMCYTCHM EA ADD ANTB: CPT

## 2019-05-07 PROCEDURE — 87116 MYCOBACTERIA CULTURE: CPT

## 2019-05-07 PROCEDURE — 31629 BRONCHOSCOPY/NEEDLE BX EACH: CPT | Performed by: INTERNAL MEDICINE

## 2019-05-07 PROCEDURE — 31627 NAVIGATIONAL BRONCHOSCOPY: CPT | Performed by: INTERNAL MEDICINE

## 2019-05-07 PROCEDURE — 99153 MOD SED SAME PHYS/QHP EA: CPT | Performed by: INTERNAL MEDICINE

## 2019-05-07 PROCEDURE — 88112 CYTOPATH CELL ENHANCE TECH: CPT

## 2019-05-07 PROCEDURE — 74011250636 HC RX REV CODE- 250/636: Performed by: INTERNAL MEDICINE

## 2019-05-07 PROCEDURE — 76040000026: Performed by: INTERNAL MEDICINE

## 2019-05-07 PROCEDURE — 77030028571 HC CATH ENDOBRNCH DISP BIOP KT SPMD -G1: Performed by: INTERNAL MEDICINE

## 2019-05-07 PROCEDURE — 77030031404 HC PTCH SENS SD DISP SPDM -A: Performed by: INTERNAL MEDICINE

## 2019-05-07 PROCEDURE — 77030003560 HC NDL HUBR BARD -A: Performed by: INTERNAL MEDICINE

## 2019-05-07 PROCEDURE — 74011250636 HC RX REV CODE- 250/636

## 2019-05-07 PROCEDURE — 77030021922 HC FCPS BIOP SD DISP SPDM -D: Performed by: INTERNAL MEDICINE

## 2019-05-07 PROCEDURE — 77030012699 HC VLV SUC CNTRL OCOA -A: Performed by: INTERNAL MEDICINE

## 2019-05-07 PROCEDURE — 31654 BRONCH EBUS IVNTJ PERPH LES: CPT | Performed by: INTERNAL MEDICINE

## 2019-05-07 RX ORDER — FENTANYL CITRATE 50 UG/ML
50 INJECTION, SOLUTION INTRAMUSCULAR; INTRAVENOUS
Status: DISCONTINUED | OUTPATIENT
Start: 2019-05-07 | End: 2019-05-07 | Stop reason: HOSPADM

## 2019-05-07 RX ORDER — HEPARIN SODIUM (PORCINE) LOCK FLUSH IV SOLN 100 UNIT/ML 100 UNIT/ML
500 SOLUTION INTRAVENOUS AS NEEDED
Status: DISCONTINUED | OUTPATIENT
Start: 2019-05-07 | End: 2019-05-07 | Stop reason: SDUPTHER

## 2019-05-07 RX ORDER — BISMUTH SUBSALICYLATE 262 MG
1 TABLET,CHEWABLE ORAL DAILY
COMMUNITY

## 2019-05-07 RX ORDER — SODIUM CHLORIDE 9 MG/ML
1000 INJECTION, SOLUTION INTRAVENOUS CONTINUOUS
Status: DISCONTINUED | OUTPATIENT
Start: 2019-05-07 | End: 2019-05-07 | Stop reason: HOSPADM

## 2019-05-07 RX ORDER — HEPARIN 100 UNIT/ML
500 SYRINGE INTRAVENOUS AS NEEDED
Status: DISCONTINUED | OUTPATIENT
Start: 2019-05-07 | End: 2019-05-07 | Stop reason: SDUPTHER

## 2019-05-07 RX ORDER — ONDANSETRON 2 MG/ML
4 INJECTION INTRAMUSCULAR; INTRAVENOUS
Status: DISCONTINUED | OUTPATIENT
Start: 2019-05-07 | End: 2019-05-07 | Stop reason: HOSPADM

## 2019-05-07 RX ORDER — FLUMAZENIL 0.1 MG/ML
0.2 INJECTION INTRAVENOUS
Status: DISCONTINUED | OUTPATIENT
Start: 2019-05-07 | End: 2019-05-07 | Stop reason: HOSPADM

## 2019-05-07 RX ORDER — MIDAZOLAM HYDROCHLORIDE 1 MG/ML
.25-5 INJECTION, SOLUTION INTRAMUSCULAR; INTRAVENOUS
Status: DISCONTINUED | OUTPATIENT
Start: 2019-05-07 | End: 2019-05-07 | Stop reason: HOSPADM

## 2019-05-07 RX ORDER — HEPARIN SODIUM (PORCINE) LOCK FLUSH IV SOLN 100 UNIT/ML 100 UNIT/ML
500 SOLUTION INTRAVENOUS AS NEEDED
Status: COMPLETED | OUTPATIENT
Start: 2019-05-07 | End: 2019-05-07

## 2019-05-07 RX ORDER — NALOXONE HYDROCHLORIDE 0.4 MG/ML
0.4 INJECTION, SOLUTION INTRAMUSCULAR; INTRAVENOUS; SUBCUTANEOUS
Status: DISCONTINUED | OUTPATIENT
Start: 2019-05-07 | End: 2019-05-07 | Stop reason: HOSPADM

## 2019-05-07 RX ORDER — LIDOCAINE HYDROCHLORIDE 40 MG/ML
SOLUTION TOPICAL ONCE
Status: COMPLETED | OUTPATIENT
Start: 2019-05-07 | End: 2019-05-07

## 2019-05-07 RX ORDER — LIDOCAINE HYDROCHLORIDE 20 MG/ML
JELLY TOPICAL ONCE
Status: COMPLETED | OUTPATIENT
Start: 2019-05-07 | End: 2019-05-07

## 2019-05-07 RX ADMIN — FENTANYL CITRATE 50 MCG: 50 INJECTION, SOLUTION INTRAMUSCULAR; INTRAVENOUS at 10:36

## 2019-05-07 RX ADMIN — FENTANYL CITRATE 50 MCG: 50 INJECTION, SOLUTION INTRAMUSCULAR; INTRAVENOUS at 10:33

## 2019-05-07 RX ADMIN — LIDOCAINE HYDROCHLORIDE: 40 SOLUTION TOPICAL at 11:00

## 2019-05-07 RX ADMIN — FENTANYL CITRATE 50 MCG: 50 INJECTION, SOLUTION INTRAMUSCULAR; INTRAVENOUS at 10:45

## 2019-05-07 RX ADMIN — FENTANYL CITRATE 50 MCG: 50 INJECTION, SOLUTION INTRAMUSCULAR; INTRAVENOUS at 11:07

## 2019-05-07 RX ADMIN — LIDOCAINE HYDROCHLORIDE: 20 JELLY TOPICAL at 11:00

## 2019-05-07 RX ADMIN — ONDANSETRON 4 MG: 2 INJECTION INTRAMUSCULAR; INTRAVENOUS at 11:40

## 2019-05-07 RX ADMIN — MIDAZOLAM HYDROCHLORIDE 1 MG: 1 INJECTION, SOLUTION INTRAMUSCULAR; INTRAVENOUS at 10:38

## 2019-05-07 RX ADMIN — FENTANYL CITRATE 50 MCG: 50 INJECTION, SOLUTION INTRAMUSCULAR; INTRAVENOUS at 10:40

## 2019-05-07 RX ADMIN — HEPARIN SODIUM (PORCINE) LOCK FLUSH IV SOLN 100 UNIT/ML 500 UNITS: 100 SOLUTION at 13:08

## 2019-05-07 RX ADMIN — MIDAZOLAM HYDROCHLORIDE 2 MG: 1 INJECTION, SOLUTION INTRAMUSCULAR; INTRAVENOUS at 10:33

## 2019-05-07 RX ADMIN — FENTANYL CITRATE 25 MCG: 50 INJECTION, SOLUTION INTRAMUSCULAR; INTRAVENOUS at 10:51

## 2019-05-07 RX ADMIN — SODIUM CHLORIDE 1000 ML: 900 INJECTION, SOLUTION INTRAVENOUS at 09:28

## 2019-05-07 RX ADMIN — MIDAZOLAM HYDROCHLORIDE 1 MG: 1 INJECTION, SOLUTION INTRAMUSCULAR; INTRAVENOUS at 11:00

## 2019-05-07 RX ADMIN — FENTANYL CITRATE 50 MCG: 50 INJECTION, SOLUTION INTRAMUSCULAR; INTRAVENOUS at 10:58

## 2019-05-07 NOTE — DISCHARGE INSTRUCTIONS
RESPIRATORY CARE - BRONCHOSCOPY - DISCHARGE INSTRUCTIONS      You received a lot of numbing medication for your throat and nose, and you also received medication to make you sleepy during your procedure. Because of this and because the bronchoscopy may have irritated your airways, we ask that you follow these directions:    1. Do not eat or drink until  12:30 . After that, you may have what you please. You may want to try some liquids first, because your throat may be a little sore. 2. Medication may cause drowsiness for several hours, therefore:  · Do not drive or operate machinery for remainder of the day. · No alcohol today  · Do not make any important or legal decisions for 24 hours  · Do not sign any legal documents for 24 hours    3. You may cough up more mucus than usual and you may see some blood, but                   this is expected and should subside by the following day. 4. If severe throat irritation, coughing, or bleeding continue, call your doctor. 5.         If you run a fever greater than 102, call [facility] at [phone]. 6.         Dr. Thierry Leo has asked that you:                A. Follow-up with oncology as scheduled on May 10 th to discuss the final results from your procedure today, and the plan of care to follow. If you have any questions from your procedure today call [facility] at [phone].     Discharge Medications: Resume medications         Instructions given to [facility] and other family members

## 2019-05-07 NOTE — PROGRESS NOTES
at the bedside for the duration of recovery, she is present for Dr. Mitesh Haney to discuss procedural findings and present for myself to interpret for discharge instructions.

## 2019-05-07 NOTE — PROCEDURES
PROCEDURE  Bronchoscopy with airway inspection and EMN aided transbronchial lung biopsy of peripheral lung mass. EQUIPEMENT  Olympus T180 bronchoscope  Super Dimension EMN system  90 deg stearable sheath  UM 17/20 Radial probe  Super D forceps, triple needle brush    INDICATION   Peripheral mass suspicious for malignancy    IMAGING  CT Chest 5/2/19          POST OP DIAGNOSIS:  Super Dimension EMN system was employed to identify and biopsy the LLL mass seen on CT. 11 transbronchial lung biopsies, 1 brushings, 1 needle biopsies, and a mini and full BAL were performed with touch preparations revealing atypical cells on BRITTANY. Histology from obtained biopsies is pending. No EBUS targets were present on imaging. Based on imaging and biopsies, pt is suspected of having metastatic colon cancer vs new lung primary. ANESTHESIA  Concious sedation with: Fentanyl 325 mcg IV; Versed 4 mg IV; Lidocaine 200 mg to tracheo-bronchial tree and vocal cords, Zofran 4mg IV for nausea/vomiting prophylaxis. AIRWAY INSPECTION  After obtaining informed consent, using a bite block, an Olympus Q 180 video bronchoscope was introduced into the trachea through the vocal cords without complication. RIGHT  LOCATION NORM/ABNORMAL DESCRIPTION   VOCAL CORDS NL    TRACHEA NL    ADARSH NL    RMSB NL    RUL NL    BI NL    RML NL    RLL NL    SUP SEGM RLL NL    MED BASAL NL    ANTERIOR BASAL NL    LATERAL BASAL NL    POSTERIOR BASAL NL            LEFT  LOCATION NORMAL/ABNORMAL TYPE   LMSB NL    CONSUELO NL    LINGULA NL    LLL NL    SUPERIOR SEG LLL NL    CHENCHO-MEDIAL LLL NL    LATERAL LLL NL    POSTERIOR LLL NL             Navigation    Olympus T 180 bronchoscope was introduced into the airways to identify and biopsy the LLL mass with the aid of Super D EMN system and radial probe US.     CT images acquired with Super D protocol were used to plan the pathway to target lesion planned using Super D software. Planning data was then used to navigate to the nodule, with verification of location using radial US. Visibility with radial US was: excellent, positioned in the center of the mass  11 transbronchial lung biopsies were obtained and evaluated via touch prep and BRITTANY. Touch preps revealed atypical cells and inflammation. Histology from obtained tissue is currently pending. TOUCH PREP BIOPSY# MALIGNANT ATYPIA/SUSPICIOUS DIAGNOSIS   LLL 1 - + -    2 - - -    3 - + -    4 - - -    5 - - -    6 - + -    7 - + -    8 - + -    9 - + -    10 - + -    11 - + -       ADDITIONAL BIOPSIES  LLL - triple needle aspiration, brushing, BAL  BAL sent for cytology, culture, afb, fungus. Not enough return for cell count, diff, or CD4:CD8. EBUS  Not performed. EBL: <28IH    Complications: None    Diagnosis: B pulmonary nodules. Suspect metastatic colon cancer vs separate lung primary. Plan:  Follow up final pathology results and follow up with oncology. May need tumor board presentation depending on findings.        Juliano Ellis MD

## 2019-05-07 NOTE — ROUTINE PROCESS
Pt. Discharged to car by tech with daughter . Vital signs stable. Able to tolerate ice chips.  Seen by MD.

## 2019-05-07 NOTE — PROGRESS NOTES
Pt.'s power port accessed using sterile technique with a 20G x 0.75 inch chow needle by Destinee Simon RN; positive blood return and flushes easily.

## 2019-05-07 NOTE — PROGRESS NOTES
present at bedside during assessment with unit nurse and info verification in lab with patient before procedure. Viky Salinas [facility] Patient Relations & Interpreting Services 
c: [phone] / [email or web address], [address] [facility] 
[email or web address]

## 2019-05-09 LAB
BACTERIA SPEC CULT: NORMAL
GRAM STN SPEC: NORMAL
SERVICE CMNT-IMP: NORMAL

## 2019-05-14 LAB
FUNGUS CULTURE, RFCO2T: POSITIVE
FUNGUS SMEAR, RFCO1T: ABNORMAL
FUNGUS SPEC CULT: NORMAL
FUNGUS STAIN, 188244: NORMAL
REFLEX TO ID, RFCO3T: ABNORMAL
SPECIMEN SOURCE: ABNORMAL
SPECIMEN SOURCE: NORMAL

## 2019-05-15 ENCOUNTER — HOSPITAL ENCOUNTER (OUTPATIENT)
Dept: LAB | Age: 69
Discharge: HOME | End: 2019-05-15
Payer: MEDICARE

## 2019-05-15 DIAGNOSIS — C19 MALIGNANT NEOPLASM OF RECTOSIGMOID (COLON) (HCC): ICD-10-CM

## 2019-05-15 LAB
ALBUMIN SERPL-MCNC: 3.8 G/DL (ref 3.2–4.6)
ALBUMIN/GLOB SERPL: 1.1 {RATIO} (ref 1.2–3.5)
ALP SERPL-CCNC: 97 U/L (ref 50–136)
ALT SERPL-CCNC: 26 U/L (ref 12–65)
ANION GAP SERPL CALC-SCNC: 5 MMOL/L (ref 7–16)
AST SERPL-CCNC: 17 U/L (ref 15–37)
BASOPHILS # BLD: 0 K/UL (ref 0–0.2)
BASOPHILS NFR BLD: 1 % (ref 0–2)
BILIRUB SERPL-MCNC: 0.5 MG/DL (ref 0.2–1.1)
BUN SERPL-MCNC: 14 MG/DL (ref 8–23)
CALCIUM SERPL-MCNC: 9 MG/DL (ref 8.3–10.4)
CEA SERPL-MCNC: 24.1 NG/ML (ref 0–3)
CHLORIDE SERPL-SCNC: 105 MMOL/L (ref 98–107)
CO2 SERPL-SCNC: 30 MMOL/L (ref 21–32)
CREAT SERPL-MCNC: 1 MG/DL (ref 0.8–1.5)
DIFFERENTIAL METHOD BLD: NORMAL
EOSINOPHIL # BLD: 0.2 K/UL (ref 0–0.8)
EOSINOPHIL NFR BLD: 3 % (ref 0.5–7.8)
ERYTHROCYTE [DISTWIDTH] IN BLOOD BY AUTOMATED COUNT: 13.1 % (ref 11.9–14.6)
GLOBULIN SER CALC-MCNC: 3.6 G/DL (ref 2.3–3.5)
GLUCOSE SERPL-MCNC: 133 MG/DL (ref 65–100)
HCT VFR BLD AUTO: 44.1 % (ref 41.1–50.3)
HGB BLD-MCNC: 15.1 G/DL (ref 13.6–17.2)
IMM GRANULOCYTES # BLD AUTO: 0 K/UL (ref 0–0.5)
IMM GRANULOCYTES NFR BLD AUTO: 0 % (ref 0–5)
LYMPHOCYTES # BLD: 1.4 K/UL (ref 0.5–4.6)
LYMPHOCYTES NFR BLD: 23 % (ref 13–44)
MCH RBC QN AUTO: 31.5 PG (ref 26.1–32.9)
MCHC RBC AUTO-ENTMCNC: 34.2 G/DL (ref 31.4–35)
MCV RBC AUTO: 92.1 FL (ref 79.6–97.8)
MONOCYTES # BLD: 0.5 K/UL (ref 0.1–1.3)
MONOCYTES NFR BLD: 9 % (ref 4–12)
NEUTS SEG # BLD: 4 K/UL (ref 1.7–8.2)
NEUTS SEG NFR BLD: 65 % (ref 43–78)
NRBC # BLD: 0 K/UL (ref 0–0.2)
PLATELET # BLD AUTO: 185 K/UL (ref 150–450)
PMV BLD AUTO: 9.8 FL (ref 9.4–12.3)
POTASSIUM SERPL-SCNC: 4 MMOL/L (ref 3.5–5.1)
PROT SERPL-MCNC: 7.4 G/DL (ref 6.3–8.2)
RBC # BLD AUTO: 4.79 M/UL (ref 4.23–5.67)
SODIUM SERPL-SCNC: 140 MMOL/L (ref 136–145)
WBC # BLD AUTO: 6.2 K/UL (ref 4.3–11.1)

## 2019-05-15 PROCEDURE — 82378 CARCINOEMBRYONIC ANTIGEN: CPT

## 2019-05-15 PROCEDURE — 85025 COMPLETE CBC W/AUTO DIFF WBC: CPT

## 2019-05-15 PROCEDURE — 80053 COMPREHEN METABOLIC PANEL: CPT

## 2019-05-16 LAB
FUNGUS ID 1, (DID), RFIM1T: NORMAL
SPECIMEN SOURCE: NORMAL

## 2019-06-21 LAB
ACID FAST STN SPEC: NEGATIVE
MYCOBACTERIUM SPEC QL CULT: NEGATIVE
SPECIMEN PREPARATION: NORMAL
SPECIMEN SOURCE: NORMAL

## 2019-12-12 ENCOUNTER — HOSPITAL ENCOUNTER (OUTPATIENT)
Dept: PET IMAGING | Age: 69
Discharge: HOME | End: 2019-12-12
Payer: MEDICARE

## 2019-12-12 DIAGNOSIS — R06.89 DYSPNEA AND RESPIRATORY ABNORMALITY: ICD-10-CM

## 2019-12-12 DIAGNOSIS — C20 MALIGNANT NEOPLASM OF RECTUM (HCC): ICD-10-CM

## 2019-12-12 DIAGNOSIS — R53.83 FATIGUE: ICD-10-CM

## 2019-12-12 DIAGNOSIS — R06.00 DYSPNEA AND RESPIRATORY ABNORMALITY: ICD-10-CM

## 2019-12-12 DIAGNOSIS — C78.00 SECONDARY MALIGNANT NEOPLASM OF LUNG (HCC): ICD-10-CM

## 2019-12-12 DIAGNOSIS — D49.0 DUDLEY-KLINGENSTEIN SYNDROME: ICD-10-CM

## 2019-12-12 DIAGNOSIS — M79.10 MYALGIA: ICD-10-CM

## 2019-12-12 DIAGNOSIS — M54.2 CERVICALGIA: ICD-10-CM

## 2019-12-12 DIAGNOSIS — R10.9 STOMACH ACHE: ICD-10-CM

## 2019-12-12 DIAGNOSIS — C18.9 COLON CANCER (HCC): ICD-10-CM

## 2019-12-12 PROCEDURE — A9552 F18 FDG: HCPCS

## 2019-12-12 PROCEDURE — 74011636320 HC RX REV CODE- 636/320

## 2019-12-12 RX ORDER — SODIUM CHLORIDE 0.9 % (FLUSH) 0.9 %
10 SYRINGE (ML) INJECTION
Status: COMPLETED | OUTPATIENT
Start: 2019-12-12 | End: 2019-12-12

## 2019-12-12 RX ADMIN — Medication 10 ML: at 15:21

## 2019-12-12 RX ADMIN — DIATRIZOATE MEGLUMINE AND DIATRIZOATE SODIUM 10 ML: 660; 100 LIQUID ORAL; RECTAL at 15:21

## 2020-03-08 ENCOUNTER — ANESTHESIA EVENT (OUTPATIENT)
Dept: ENDOSCOPY | Age: 70
End: 2020-03-08
Payer: MEDICARE

## 2020-03-08 RX ORDER — SODIUM CHLORIDE, SODIUM LACTATE, POTASSIUM CHLORIDE, CALCIUM CHLORIDE 600; 310; 30; 20 MG/100ML; MG/100ML; MG/100ML; MG/100ML
100 INJECTION, SOLUTION INTRAVENOUS CONTINUOUS
Status: CANCELLED | OUTPATIENT
Start: 2020-03-08

## 2020-03-09 ENCOUNTER — HOSPITAL ENCOUNTER (OUTPATIENT)
Age: 70
Setting detail: OUTPATIENT SURGERY
Discharge: HOME | End: 2020-03-09
Attending: SURGERY | Admitting: SURGERY
Payer: MEDICARE

## 2020-03-09 ENCOUNTER — ANESTHESIA (OUTPATIENT)
Dept: ENDOSCOPY | Age: 70
End: 2020-03-09
Payer: MEDICARE

## 2020-03-09 VITALS
TEMPERATURE: 98 F | OXYGEN SATURATION: 96 % | RESPIRATION RATE: 14 BRPM | SYSTOLIC BLOOD PRESSURE: 176 MMHG | DIASTOLIC BLOOD PRESSURE: 77 MMHG | HEART RATE: 43 BPM

## 2020-03-09 PROCEDURE — 74011250636 HC RX REV CODE- 250/636: Performed by: NURSE ANESTHETIST, CERTIFIED REGISTERED

## 2020-03-09 PROCEDURE — 74011000250 HC RX REV CODE- 250: Performed by: NURSE ANESTHETIST, CERTIFIED REGISTERED

## 2020-03-09 PROCEDURE — 77030003560 HC NDL HUBR BARD -A: Performed by: SURGERY

## 2020-03-09 PROCEDURE — 76040000026: Performed by: SURGERY

## 2020-03-09 PROCEDURE — 88305 TISSUE EXAM BY PATHOLOGIST: CPT

## 2020-03-09 PROCEDURE — 77030021593 HC FCPS BIOP ENDOSC BSC -A: Performed by: SURGERY

## 2020-03-09 PROCEDURE — 77030029929: Performed by: SURGERY

## 2020-03-09 PROCEDURE — 77030020018 HC MRKR ENDOSC SPOT 5ML SYR GISP -B: Performed by: SURGERY

## 2020-03-09 PROCEDURE — 76060000032 HC ANESTHESIA 0.5 TO 1 HR: Performed by: SURGERY

## 2020-03-09 PROCEDURE — 74011250636 HC RX REV CODE- 250/636: Performed by: SURGERY

## 2020-03-09 RX ORDER — PROPOFOL 10 MG/ML
INJECTION, EMULSION INTRAVENOUS AS NEEDED
Status: DISCONTINUED | OUTPATIENT
Start: 2020-03-09 | End: 2020-03-09 | Stop reason: HOSPADM

## 2020-03-09 RX ORDER — HEPARIN 100 UNIT/ML
500 SYRINGE INTRAVENOUS AS NEEDED
Status: DISCONTINUED | OUTPATIENT
Start: 2020-03-09 | End: 2020-03-09 | Stop reason: HOSPADM

## 2020-03-09 RX ORDER — PROPOFOL 10 MG/ML
INJECTION, EMULSION INTRAVENOUS
Status: DISCONTINUED | OUTPATIENT
Start: 2020-03-09 | End: 2020-03-09 | Stop reason: HOSPADM

## 2020-03-09 RX ORDER — GLYCOPYRROLATE 0.2 MG/ML
INJECTION INTRAMUSCULAR; INTRAVENOUS AS NEEDED
Status: DISCONTINUED | OUTPATIENT
Start: 2020-03-09 | End: 2020-03-09 | Stop reason: HOSPADM

## 2020-03-09 RX ORDER — SODIUM CHLORIDE, SODIUM LACTATE, POTASSIUM CHLORIDE, CALCIUM CHLORIDE 600; 310; 30; 20 MG/100ML; MG/100ML; MG/100ML; MG/100ML
INJECTION, SOLUTION INTRAVENOUS
Status: DISCONTINUED | OUTPATIENT
Start: 2020-03-09 | End: 2020-03-09 | Stop reason: HOSPADM

## 2020-03-09 RX ADMIN — HEPARIN SODIUM (PORCINE) LOCK FLUSH IV SOLN 100 UNIT/ML 500 UNITS: 100 SOLUTION at 14:17

## 2020-03-09 RX ADMIN — GLYCOPYRROLATE 0.4 MG: 0.2 INJECTION, SOLUTION INTRAMUSCULAR; INTRAVENOUS at 13:27

## 2020-03-09 RX ADMIN — SODIUM CHLORIDE, SODIUM LACTATE, POTASSIUM CHLORIDE, AND CALCIUM CHLORIDE: 600; 310; 30; 20 INJECTION, SOLUTION INTRAVENOUS at 12:35

## 2020-03-09 RX ADMIN — PROPOFOL 50 MG: 10 INJECTION, EMULSION INTRAVENOUS at 12:50

## 2020-03-09 RX ADMIN — PROPOFOL 140 MCG/KG/MIN: 10 INJECTION, EMULSION INTRAVENOUS at 12:50

## 2020-03-09 RX ADMIN — PROPOFOL 50 MG: 10 INJECTION, EMULSION INTRAVENOUS at 12:52

## 2020-03-09 RX ADMIN — PROPOFOL 30 MG: 10 INJECTION, EMULSION INTRAVENOUS at 13:01

## 2020-03-09 NOTE — ROUTINE PROCESS
VSS. No complaints noted. Education given and reviewed with wife who voiced understanding. Pt wheeled out via wheelchair by Mehul Ross.

## 2020-03-09 NOTE — PROGRESS NOTES
Language services has been notified of the need of an . [address] is available upon request. Please call [phone] to contact an . Thank you.      Linda Gonzalez  CERTIFIED HEALTHCARE INTERPRETERTM ([facility] [place]  Language Services Department   [facility] [address] [place] [address]  [phone] (phone)

## 2020-03-09 NOTE — DISCHARGE INSTRUCTIONS
Gastrointestinal Colonoscopy/Flexible Sigmoidoscopy - Lower Exam Discharge Instructions  1. Call Dr. Tano Solomon at [phone] for any problems or questions. 2. Contact the doctors office for follow up appointment as directed  3. Medication may cause drowsiness for several hours, therefore:  · Do not drive or operate machinery for reminder of the day. · No alcohol today. · Do not make any important or legal decisions for 24 hours. · Do not sign any legal documents for 24 hours. 4. Ordinarily, you may resume regular diet and activity after exam unless otherwise specified by your physician. 5. Because of air put into your colon during exam, you may experience some abdominal distension, relieved by the passage of gas, for several hours. 6. Contact your physician if you have any of the following:  · Excessive amount of bleeding - large amount when having a bowel movement. Occasional specks of blood with bowel movement would not be unusual.  · Severe abdominal pain  · Fever or Chills  Any additional instructions: follow up pathology results      Instructions given to [facility] and other family members.

## 2020-03-09 NOTE — H&P
History and Physical      Patient: Dona Valadez    Physician: Deni Salcido MD    Referring Physician: Viola Llanos DO    Chief Complaint: For colonoscopy    History of Present Illness: Pt presents for colonoscopy. S/p sigmoid colectomy 6/15/2016 for pT3N0 adenocarcinoma. One-year surveillance exam 2017 (-). He was diagnosed with lung metastasis 5/2019. History:  Past Medical History:   Diagnosis Date    Allergic rhinitis     BPH (benign prostatic hyperplasia) 6/6/16    per pt- used to be on medication- off due to expense    Constipation     Diabetes ([address]     pt denies    GERD (gastroesophageal reflux disease)     occ-- no meds    History of bleeding ulcers     Lung cancer ([address]     received treatment for 4 months in [place] - last treatment received in [facility] Malignant neoplasm of rectosigmoid (colon) ([address] 2016    surg and chemo    Port-A-Cath in place     Left chest port    PUD (peptic ulcer disease) yrs ago    Bleeding ulcer    Stool color black     hx of    Type 2 diabetes mellitus ([address]      Past Surgical History:   Procedure Laterality Date    COLONOSCOPY N/A 6/10/2016    COLONOSCOPY performed by [facility]am Qureshi MD at [facility] ENDOSCOPY    COLONOSCOPY N/A 3/13/2017    Repeat in 3 years; COLONOSCOPY/ 27 performed by Deni Salcido MD at [address] [place] [address] N/A 6/13/2016    SIGMOIDOSCOPY FLEXIBLE performed by Rustam Qureshi MD at [facility] ENDOSCOPY    HX CHOLECYSTECTOMY      HX COLECTOMY  2016    Laparoscopic-assisted sigmoid colectomy with laparoscopic    HX VASCULAR ACCESS      port placed--left    [place] COLONOSCOPY FLX DX W/COLLJ SPEC WHEN PFRMD  03/13/2017    Dr. Yimi Pedersen in 3 years.       Social History     Socioeconomic History    Marital status:      Spouse name: Not on file    Number of children: Not on file    Years of education: Not on file    Highest education level: Not on file   Tobacco Use    Smoking status: Never Smoker    Smokeless tobacco: Never Used   Substance and Sexual Activity    Alcohol use: No     Alcohol/week: 0.0 standard drinks    Drug use: No      Family History   Problem Relation Age of Onset    Cancer Brother        Medications:   Prior to Admission medications    Medication Sig Start Date End Date Taking? Authorizing Provider   ascorbic acid, vitamin C, (VITAMIN C) 250 mg tablet Take  by mouth daily. Yes Provider, Historical   metFORMIN (GLUCOPHAGE) 1,000 mg tablet Take 1,000 mg by mouth daily. Yes Provider, Historical   multivitamin (ONE A DAY) tablet Take 1 Tab by mouth daily. Yes Provider, Historical   cholecalciferol, vitamin D3, (VITAMIN D3) 2,000 unit tab Take 2,000 Units by mouth daily. Yes Provider, Historical   Lactobac no.41/Bifidobact no.7 (PROBIOTIC-10 PO) Take 1 Tab by mouth daily. Provider, Historical       Allergies: Allergies   Allergen Reactions    No Known Allergies Other (comments)       Physical Exam:     Vital Signs:   Visit Vitals  /75   Pulse (!) 50   Temp 97.6 °F (36.4 °C)   Resp 18   SpO2 98%     . General: no distress      Heart: regular   Lungs: unlabored   Abdominal: soft   Neurological: Grossly normal        Findings/Diagnosis: personal history of colon cancer    Plan of Care/Planned Procedure: Colonoscopy, possible polypectomy. Pt/designee has reviewed the colonoscopy information sheet. Any questions have been discussed. They agree to proceed.       Signed:  Sakina Montes MD   3/9/2020

## 2020-03-09 NOTE — ANESTHESIA POSTPROCEDURE EVALUATION
Procedure(s):  COLONOSCOPY/ BMI 25   COLON BIOPSY  INJECTION. total IV anesthesia    Anesthesia Post Evaluation      Multimodal analgesia: multimodal analgesia used between 6 hours prior to anesthesia start to PACU discharge  Patient location during evaluation: PACU  Patient participation: complete - patient participated  Level of consciousness: awake and alert  Pain management: adequate  Airway patency: patent  Anesthetic complications: no  Cardiovascular status: acceptable (patient was bradycardic preop and largely unchangd in PACU - asymptomatic with normal BP)  Respiratory status: acceptable  Hydration status: acceptable  Post anesthesia nausea and vomiting:  controlled      Vitals Value Taken Time   /61 3/9/2020  1:57 PM   Temp 36.7 °C (98 °F) 3/9/2020  1:29 PM   Pulse 41 3/9/2020  2:04 PM   Resp 14 3/9/2020  1:57 PM   SpO2 98 % 3/9/2020  2:04 PM   Vitals shown include unvalidated device data.

## 2020-03-09 NOTE — PROGRESS NOTES
Port to left upper chest flushed with 10 ml normal saline and 500 units heparin lock flush. Dry gauze dressing and tape applied.

## 2020-03-09 NOTE — PROCEDURES
Procedure in Detail:  Informed consent was obtained for the procedure. The patient was placed in the left lateral decubitus position and sedation was induced by anesthesia. The scope was inserted into the rectum and advanced under direct vision to the cecum, which was identified by the ileocecal valve and appendiceal orifice. The quality of the colonic preparation was adequate. A careful inspection was made as the colonoscope was withdrawn, including a retroflexed view of the rectum; findings and interventions are described below. Appropriate photodocumentation was obtained. Findings:   Rectum:   Normal  Sigmoid:     - surgically removed; patent anastomosis at 19cm.  mild inflammation just distally-- biopsies obtained  Descending Colon:   Normal  Transverse Colon:     - 15+mm sessile/flat mass at hepatic flexure, hard and friable. biopsies obtained. area marked with SPOT tattoo  Ascending Colon:   Normal  Cecum:   Normal          Specimens: Specimens were collected and sent to pathology. Complications: None; patient tolerated the procedure well. \    EBL - minimal    Recommendations:   - Await pathology.      Signed By: Jenny Dong MD                        March 9, 2020

## 2020-03-09 NOTE — ANESTHESIA PREPROCEDURE EVALUATION
Anesthetic History   No history of anesthetic complications            Review of Systems / Medical History  Patient summary reviewed and pertinent labs reviewed    Pulmonary                Comments: Lung CA s/p chemo   Neuro/Psych   Within defined limits           Cardiovascular                  Exercise tolerance: >4 METS: Denied chest pain, SOB, syncope      GI/Hepatic/Renal     GERD: well controlled      PUD     Endo/Other    Diabetes: well controlled    Cancer (colon CA s/p resection and lung CA s/p chemo)     Other Findings              Physical Exam    Airway  Mallampati: II  TM Distance: 4 - 6 cm  Neck ROM: normal range of motion   Mouth opening: Normal     Cardiovascular  Regular rate and rhythm,  S1 and S2 normal,  no murmur, click, rub, or gallop             Dental    Dentition: Upper partial plate and Lower partial plate     Pulmonary  Breath sounds clear to auscultation               Abdominal  GI exam deferred       Other Findings            Anesthetic Plan    ASA: 2  Anesthesia type: total IV anesthesia          Induction: Intravenous  Anesthetic plan and risks discussed with: Patient and Family      Pt and family understand English well - offered the services of an  and they were comfortable without an

## 2021-09-03 ENCOUNTER — HOSPITAL ENCOUNTER (INPATIENT)
Age: 71
LOS: 3 days | Discharge: HOME | DRG: 389 | End: 2021-09-07
Attending: EMERGENCY MEDICINE | Admitting: FAMILY MEDICINE
Payer: MEDICARE

## 2021-09-03 ENCOUNTER — APPOINTMENT (OUTPATIENT)
Dept: CT IMAGING | Age: 71
DRG: 389 | End: 2021-09-03
Attending: EMERGENCY MEDICINE
Payer: MEDICARE

## 2021-09-03 DIAGNOSIS — C78.00 MALIGNANT NEOPLASM METASTATIC TO LUNG, UNSPECIFIED LATERALITY (HCC): ICD-10-CM

## 2021-09-03 DIAGNOSIS — R10.13 ABDOMINAL PAIN, EPIGASTRIC: Primary | ICD-10-CM

## 2021-09-03 DIAGNOSIS — K56.609 SBO (SMALL BOWEL OBSTRUCTION) (HCC): ICD-10-CM

## 2021-09-03 DIAGNOSIS — C18.9 MALIGNANT NEOPLASM OF COLON, UNSPECIFIED PART OF COLON (HCC): ICD-10-CM

## 2021-09-03 PROCEDURE — 96374 THER/PROPH/DIAG INJ IV PUSH: CPT

## 2021-09-03 PROCEDURE — 74011000636 HC RX REV CODE- 636: Performed by: EMERGENCY MEDICINE

## 2021-09-03 PROCEDURE — 96375 TX/PRO/DX INJ NEW DRUG ADDON: CPT

## 2021-09-03 PROCEDURE — 99284 EMERGENCY DEPT VISIT MOD MDM: CPT

## 2021-09-03 PROCEDURE — 96376 TX/PRO/DX INJ SAME DRUG ADON: CPT

## 2021-09-03 PROCEDURE — 74011250636 HC RX REV CODE- 250/636: Performed by: EMERGENCY MEDICINE

## 2021-09-03 RX ORDER — METOCLOPRAMIDE HYDROCHLORIDE 5 MG/ML
10 INJECTION INTRAMUSCULAR; INTRAVENOUS
Status: COMPLETED | OUTPATIENT
Start: 2021-09-03 | End: 2021-09-04

## 2021-09-03 RX ORDER — FAMOTIDINE 20 MG/1
20 TABLET, FILM COATED ORAL 2 TIMES DAILY
COMMUNITY
End: 2021-09-14

## 2021-09-03 RX ORDER — LABETALOL HYDROCHLORIDE 5 MG/ML
20 INJECTION, SOLUTION INTRAVENOUS
Status: COMPLETED | OUTPATIENT
Start: 2021-09-03 | End: 2021-09-04

## 2021-09-03 RX ORDER — HYDROMORPHONE HYDROCHLORIDE 1 MG/ML
1 INJECTION, SOLUTION INTRAMUSCULAR; INTRAVENOUS; SUBCUTANEOUS ONCE
Status: COMPLETED | OUTPATIENT
Start: 2021-09-03 | End: 2021-09-04

## 2021-09-03 RX ADMIN — SODIUM CHLORIDE 1000 ML: 900 INJECTION, SOLUTION INTRAVENOUS at 23:59

## 2021-09-03 RX ADMIN — DIATRIZOATE MEGLUMINE AND DIATRIZOATE SODIUM 15 ML: 600; 100 SOLUTION ORAL; RECTAL at 23:58

## 2021-09-04 ENCOUNTER — APPOINTMENT (OUTPATIENT)
Dept: CT IMAGING | Age: 71
DRG: 389 | End: 2021-09-04
Attending: EMERGENCY MEDICINE
Payer: MEDICARE

## 2021-09-04 ENCOUNTER — APPOINTMENT (OUTPATIENT)
Dept: GENERAL RADIOLOGY | Age: 71
DRG: 389 | End: 2021-09-04
Attending: FAMILY MEDICINE
Payer: MEDICARE

## 2021-09-04 PROBLEM — C78.00 COLON CANCER METASTASIZED TO LUNG (HCC): Chronic | Status: ACTIVE | Noted: 2021-09-04

## 2021-09-04 PROBLEM — K56.609 SBO (SMALL BOWEL OBSTRUCTION) (HCC): Status: ACTIVE | Noted: 2021-09-04

## 2021-09-04 PROBLEM — C18.9 COLON CANCER METASTASIZED TO LUNG (HCC): Chronic | Status: ACTIVE | Noted: 2021-09-04

## 2021-09-04 LAB
ALBUMIN SERPL-MCNC: 4.2 G/DL (ref 3.2–4.6)
ALBUMIN/GLOB SERPL: 1.2 {RATIO} (ref 1.2–3.5)
ALP SERPL-CCNC: 103 U/L (ref 50–136)
ALT SERPL-CCNC: 19 U/L (ref 12–65)
ANION GAP SERPL CALC-SCNC: 3 MMOL/L (ref 7–16)
ANION GAP SERPL CALC-SCNC: 5 MMOL/L (ref 7–16)
AST SERPL-CCNC: 15 U/L (ref 15–37)
BASOPHILS # BLD: 0 K/UL (ref 0–0.2)
BASOPHILS NFR BLD: 0 % (ref 0–2)
BILIRUB SERPL-MCNC: 0.6 MG/DL (ref 0.2–1.1)
BUN SERPL-MCNC: 13 MG/DL (ref 8–23)
BUN SERPL-MCNC: 17 MG/DL (ref 8–23)
CALCIUM SERPL-MCNC: 9.3 MG/DL (ref 8.3–10.4)
CALCIUM SERPL-MCNC: 9.9 MG/DL (ref 8.3–10.4)
CHLORIDE SERPL-SCNC: 104 MMOL/L (ref 98–107)
CHLORIDE SERPL-SCNC: 104 MMOL/L (ref 98–107)
CO2 SERPL-SCNC: 31 MMOL/L (ref 21–32)
CO2 SERPL-SCNC: 31 MMOL/L (ref 21–32)
CREAT SERPL-MCNC: 0.98 MG/DL (ref 0.8–1.5)
CREAT SERPL-MCNC: 0.98 MG/DL (ref 0.8–1.5)
DIFFERENTIAL METHOD BLD: ABNORMAL
EOSINOPHIL # BLD: 0.1 K/UL (ref 0–0.8)
EOSINOPHIL NFR BLD: 1 % (ref 0.5–7.8)
ERYTHROCYTE [DISTWIDTH] IN BLOOD BY AUTOMATED COUNT: 12.6 % (ref 11.9–14.6)
GLOBULIN SER CALC-MCNC: 3.5 G/DL (ref 2.3–3.5)
GLUCOSE SERPL-MCNC: 117 MG/DL (ref 65–100)
GLUCOSE SERPL-MCNC: 146 MG/DL (ref 65–100)
HCT VFR BLD AUTO: 45.9 % (ref 41.1–50.3)
HGB BLD-MCNC: 15.5 G/DL (ref 13.6–17.2)
IMM GRANULOCYTES # BLD AUTO: 0.1 K/UL (ref 0–0.5)
IMM GRANULOCYTES NFR BLD AUTO: 1 % (ref 0–5)
LIPASE SERPL-CCNC: 48 U/L (ref 73–393)
LYMPHOCYTES # BLD: 0.9 K/UL (ref 0.5–4.6)
LYMPHOCYTES NFR BLD: 9 % (ref 13–44)
MCH RBC QN AUTO: 32.2 PG (ref 26.1–32.9)
MCHC RBC AUTO-ENTMCNC: 33.8 G/DL (ref 31.4–35)
MCV RBC AUTO: 95.4 FL (ref 79.6–97.8)
MONOCYTES # BLD: 0.6 K/UL (ref 0.1–1.3)
MONOCYTES NFR BLD: 5 % (ref 4–12)
NEUTS SEG # BLD: 8.9 K/UL (ref 1.7–8.2)
NEUTS SEG NFR BLD: 84 % (ref 43–78)
NRBC # BLD: 0 K/UL (ref 0–0.2)
PLATELET # BLD AUTO: 168 K/UL (ref 150–450)
PMV BLD AUTO: 10.3 FL (ref 9.4–12.3)
POTASSIUM SERPL-SCNC: 4.2 MMOL/L (ref 3.5–5.1)
POTASSIUM SERPL-SCNC: 4.3 MMOL/L (ref 3.5–5.1)
PROT SERPL-MCNC: 7.7 G/DL (ref 6.3–8.2)
RBC # BLD AUTO: 4.81 M/UL (ref 4.23–5.6)
SODIUM SERPL-SCNC: 138 MMOL/L (ref 136–145)
SODIUM SERPL-SCNC: 140 MMOL/L (ref 136–145)
WBC # BLD AUTO: 10.6 K/UL (ref 4.3–11.1)

## 2021-09-04 PROCEDURE — 74011250636 HC RX REV CODE- 250/636: Performed by: FAMILY MEDICINE

## 2021-09-04 PROCEDURE — 36415 COLL VENOUS BLD VENIPUNCTURE: CPT

## 2021-09-04 PROCEDURE — 74011250637 HC RX REV CODE- 250/637: Performed by: EMERGENCY MEDICINE

## 2021-09-04 PROCEDURE — 83690 ASSAY OF LIPASE: CPT

## 2021-09-04 PROCEDURE — 0D9670Z DRAINAGE OF STOMACH WITH DRAINAGE DEVICE, VIA NATURAL OR ARTIFICIAL OPENING: ICD-10-PCS | Performed by: EMERGENCY MEDICINE

## 2021-09-04 PROCEDURE — 65270000029 HC RM PRIVATE

## 2021-09-04 PROCEDURE — 74177 CT ABD & PELVIS W/CONTRAST: CPT

## 2021-09-04 PROCEDURE — 74011000636 HC RX REV CODE- 636: Performed by: EMERGENCY MEDICINE

## 2021-09-04 PROCEDURE — 74011250637 HC RX REV CODE- 250/637: Performed by: FAMILY MEDICINE

## 2021-09-04 PROCEDURE — 85025 COMPLETE CBC W/AUTO DIFF WBC: CPT

## 2021-09-04 PROCEDURE — 74018 RADEX ABDOMEN 1 VIEW: CPT

## 2021-09-04 PROCEDURE — 74011250636 HC RX REV CODE- 250/636: Performed by: EMERGENCY MEDICINE

## 2021-09-04 PROCEDURE — 74011000250 HC RX REV CODE- 250: Performed by: EMERGENCY MEDICINE

## 2021-09-04 PROCEDURE — 80053 COMPREHEN METABOLIC PANEL: CPT

## 2021-09-04 PROCEDURE — 74011000258 HC RX REV CODE- 258: Performed by: EMERGENCY MEDICINE

## 2021-09-04 RX ORDER — HYDROMORPHONE HYDROCHLORIDE 1 MG/ML
1 INJECTION, SOLUTION INTRAMUSCULAR; INTRAVENOUS; SUBCUTANEOUS
Status: DISCONTINUED | OUTPATIENT
Start: 2021-09-04 | End: 2021-09-07 | Stop reason: HOSPADM

## 2021-09-04 RX ORDER — ONDANSETRON 2 MG/ML
4 INJECTION INTRAMUSCULAR; INTRAVENOUS
Status: DISCONTINUED | OUTPATIENT
Start: 2021-09-04 | End: 2021-09-07 | Stop reason: HOSPADM

## 2021-09-04 RX ORDER — SODIUM CHLORIDE 0.9 % (FLUSH) 0.9 %
5-40 SYRINGE (ML) INJECTION AS NEEDED
Status: DISCONTINUED | OUTPATIENT
Start: 2021-09-04 | End: 2021-09-07 | Stop reason: HOSPADM

## 2021-09-04 RX ORDER — SODIUM CHLORIDE 0.9 % (FLUSH) 0.9 %
10 SYRINGE (ML) INJECTION
Status: COMPLETED | OUTPATIENT
Start: 2021-09-04 | End: 2021-09-04

## 2021-09-04 RX ORDER — ONDANSETRON 2 MG/ML
4 INJECTION INTRAMUSCULAR; INTRAVENOUS
Status: COMPLETED | OUTPATIENT
Start: 2021-09-04 | End: 2021-09-04

## 2021-09-04 RX ORDER — OXYMETAZOLINE HCL 0.05 %
2 SPRAY, NON-AEROSOL (ML) NASAL
Status: COMPLETED | OUTPATIENT
Start: 2021-09-04 | End: 2021-09-04

## 2021-09-04 RX ORDER — LIDOCAINE HYDROCHLORIDE 40 MG/ML
SOLUTION TOPICAL ONCE
Status: COMPLETED | OUTPATIENT
Start: 2021-09-04 | End: 2021-09-04

## 2021-09-04 RX ORDER — ACETAMINOPHEN 650 MG/1
650 SUPPOSITORY RECTAL
Status: DISCONTINUED | OUTPATIENT
Start: 2021-09-04 | End: 2021-09-07 | Stop reason: HOSPADM

## 2021-09-04 RX ORDER — ACETAMINOPHEN 325 MG/1
650 TABLET ORAL
Status: DISCONTINUED | OUTPATIENT
Start: 2021-09-04 | End: 2021-09-07 | Stop reason: HOSPADM

## 2021-09-04 RX ORDER — PROMETHAZINE HYDROCHLORIDE 25 MG/1
12.5 TABLET ORAL
Status: DISCONTINUED | OUTPATIENT
Start: 2021-09-04 | End: 2021-09-07 | Stop reason: HOSPADM

## 2021-09-04 RX ORDER — SODIUM CHLORIDE 9 MG/ML
75 INJECTION, SOLUTION INTRAVENOUS CONTINUOUS
Status: DISPENSED | OUTPATIENT
Start: 2021-09-04 | End: 2021-09-05

## 2021-09-04 RX ORDER — HYDROMORPHONE HYDROCHLORIDE 1 MG/ML
1 INJECTION, SOLUTION INTRAMUSCULAR; INTRAVENOUS; SUBCUTANEOUS ONCE
Status: COMPLETED | OUTPATIENT
Start: 2021-09-04 | End: 2021-09-04

## 2021-09-04 RX ORDER — SODIUM CHLORIDE 0.9 % (FLUSH) 0.9 %
5-40 SYRINGE (ML) INJECTION EVERY 8 HOURS
Status: DISCONTINUED | OUTPATIENT
Start: 2021-09-04 | End: 2021-09-07 | Stop reason: HOSPADM

## 2021-09-04 RX ORDER — ENOXAPARIN SODIUM 100 MG/ML
40 INJECTION SUBCUTANEOUS DAILY
Status: DISCONTINUED | OUTPATIENT
Start: 2021-09-04 | End: 2021-09-07 | Stop reason: HOSPADM

## 2021-09-04 RX ADMIN — ACETAMINOPHEN 650 MG: 325 TABLET, FILM COATED ORAL at 19:41

## 2021-09-04 RX ADMIN — OXYMETAZOLINE HCL 2 SPRAY: 0.05 SPRAY NASAL at 02:29

## 2021-09-04 RX ADMIN — SODIUM CHLORIDE 100 ML: 900 INJECTION, SOLUTION INTRAVENOUS at 00:57

## 2021-09-04 RX ADMIN — Medication 10 ML: at 00:57

## 2021-09-04 RX ADMIN — ENOXAPARIN SODIUM 40 MG: 60 INJECTION SUBCUTANEOUS at 09:27

## 2021-09-04 RX ADMIN — IOPAMIDOL 100 ML: 755 INJECTION, SOLUTION INTRAVENOUS at 00:57

## 2021-09-04 RX ADMIN — ONDANSETRON 4 MG: 2 INJECTION INTRAMUSCULAR; INTRAVENOUS at 02:27

## 2021-09-04 RX ADMIN — LIDOCAINE HYDROCHLORIDE 1 ML: 40 SOLUTION TOPICAL at 02:31

## 2021-09-04 RX ADMIN — Medication 10 ML: at 13:32

## 2021-09-04 RX ADMIN — Medication 10 ML: at 22:25

## 2021-09-04 RX ADMIN — HYDROMORPHONE HYDROCHLORIDE 1 MG: 1 INJECTION, SOLUTION INTRAMUSCULAR; INTRAVENOUS; SUBCUTANEOUS at 02:27

## 2021-09-04 RX ADMIN — SODIUM CHLORIDE 75 ML/HR: 900 INJECTION, SOLUTION INTRAVENOUS at 04:23

## 2021-09-04 RX ADMIN — Medication 10 ML: at 05:23

## 2021-09-04 RX ADMIN — HYDROMORPHONE HYDROCHLORIDE 1 MG: 1 INJECTION, SOLUTION INTRAMUSCULAR; INTRAVENOUS; SUBCUTANEOUS at 00:01

## 2021-09-04 RX ADMIN — LABETALOL HYDROCHLORIDE: 5 INJECTION INTRAVENOUS at 00:01

## 2021-09-04 RX ADMIN — METOCLOPRAMIDE 10 MG: 5 INJECTION, SOLUTION INTRAMUSCULAR; INTRAVENOUS at 00:01

## 2021-09-04 NOTE — PROGRESS NOTES
Hospitalist Progress Note   Admit Date:  9/3/2021 11:29 PM   Name:  Leroy Dias   Age:  80 y.o. Sex:  male  :  1950   MRN:  [id number]     Presenting Complaint: Abdominal Pain    Reason(s) for Admission: SBO (small bowel obstruction) [facility]) [facility] Course & Interval History:   Patient presented with abdominal pain. Patient with past medical history of    Colon cancer with s/p resection and chemotherapy. He has had metastasis to the lungs. He has had abdominal pain off and on for the past 2 months and worsened in the past 2 weeks. Much worse in the past couple of days. He is found to have bowel obstruction from CT abdomen and pelvis. He is admitted and is being treated with IV fluid, NPO and NG tube placement. Subjective (21):  21   No flatus. No bowel movement since admission. No fever. No shaking. No chills. No shortness of breath. No chest pain. Assessment & Plan:     Principal Problem:    SBO (small bowel obstruction) ([facility] [address] (2021)  Continue with NPO. IV fluid. General surgery is consulted. Symptomatic treatments   Monitor symptoms and will repeat imaging study later. Active Problems:    Lung mass (2019)  From colon cancer metastasis. Will continue treatment with his oncologist.       Colon cancer metastasized to lung [facility]) (2021)    I have discussed the plan of care with patient and family member at bedside.          Dispo/Discharge Planning:    to be determined     Diet:  DIET NPO  DVT PPx: Lovenox SC   Code status: Full Code    Active Hospital Problems    Diagnosis Date Noted    SBO (small bowel obstruction) ([facility] [address] 2021    Colon cancer metastasized to lung ([facility] [address] 2021    Lung mass 2019       Objective:     Patient Vitals for the past 24 hrs:   Temp Pulse Resp BP SpO2   21 0822 98.2 °F (36.8 °C) 80 12 130/80 93 %   21 0324 98.2 °F (36.8 °C) 82 18 (!) 162/88 92 %   21 0300 98.5 °F (36.9 °C)  20 (!) 167/96 93 %   09/04/21 0240  88  (!) 164/98 (!) 89 %   09/04/21 0144  86  (!) 183/100 95 %   09/04/21 0139  82   97 %   09/04/21 0030  85  (!) 158/88 91 %   09/04/21 0003    (!) 206/98    09/03/21 2333 98.5 °F (36.9 °C) 87 20 (!) 205/101 98 %     Oxygen Therapy  O2 Sat (%): 93 % (09/04/21 0822)  Pulse via Oximetry: 88 beats per minute (09/04/21 0240)  O2 Device: None (Room air) (09/04/21 0754)    Estimated body mass index is 23.01 kg/m² as calculated from the following:    Height as of this encounter: 5' 11\" (1.803 m). Weight as of this encounter: 74.8 kg (165 lb). Intake/Output Summary (Last 24 hours) at 9/4/2021 1129  Last data filed at 9/4/2021 0110  Gross per 24 hour   Intake 1100 ml   Output    Net 1100 ml         Physical Exam:     Visit Vitals  /80 (BP 1 Location: Left upper arm, BP Patient Position: Supine)   Pulse 80   Temp 98.2 °F (36.8 °C)   Resp 12   Ht 5' 11\" (1.803 m)   Wt 74.8 kg (165 lb)   SpO2 93%   BMI 23.01 kg/m²        General:    Well nourished. No overt distress. Patient is lying flat in bed. NG tube in place. Head:  Normocephalic, atraumatic  Eyes:  Sclerae appear normal.  Pupils equally round. ENT:  Nares appear normal, no drainage. Moist oral mucosa  Neck:  No restricted ROM. Trachea midline   CV:   RRR. No m/r/g. No jugular venous distension. Lungs:   CTAB. No wheezing, rhonchi, or rales. Respirations even, unlabored  Abdomen: Bowel sounds present. Soft, nontender, nondistended. Previous surgical scars. NG tube in place. Extremities: No cyanosis or clubbing. No edema  Skin:     No rashes and normal coloration. Warm and dry. Neuro:  Cranial nerves II-XII grossly intact. Sensation intact  Psych:  Normal mood and affect.   Alert and oriented x3    I have reviewed ordered lab tests and independently visualized imaging below:    Last 24hr Labs:  Recent Results (from the past 24 hour(s))   CBC WITH AUTOMATED DIFF    Collection Time: 09/04/21 12:06 AM   Result Value Ref Range    WBC 10.6 4.3 - 11.1 K/uL    RBC 4.81 4.23 - 5.6 M/uL    HGB 15.5 13.6 - 17.2 g/dL    HCT 45.9 41.1 - 50.3 %    MCV 95.4 79.6 - 97.8 FL    MCH 32.2 26.1 - 32.9 PG    MCHC 33.8 31.4 - 35.0 g/dL    RDW 12.6 11.9 - 14.6 %    PLATELET 573 647 - 932 K/uL    MPV 10.3 9.4 - 12.3 FL    ABSOLUTE NRBC 0.00 0.0 - 0.2 K/uL    DF AUTOMATED      NEUTROPHILS 84 (H) 43 - 78 %    LYMPHOCYTES 9 (L) 13 - 44 %    MONOCYTES 5 4.0 - 12.0 %    EOSINOPHILS 1 0.5 - 7.8 %    BASOPHILS 0 0.0 - 2.0 %    IMMATURE GRANULOCYTES 1 0.0 - 5.0 %    ABS. NEUTROPHILS 8.9 (H) 1.7 - 8.2 K/UL    ABS. LYMPHOCYTES 0.9 0.5 - 4.6 K/UL    ABS. MONOCYTES 0.6 0.1 - 1.3 K/UL    ABS. EOSINOPHILS 0.1 0.0 - 0.8 K/UL    ABS. BASOPHILS 0.0 0.0 - 0.2 K/UL    ABS. IMM. GRANS. 0.1 0.0 - 0.5 K/UL   METABOLIC PANEL, COMPREHENSIVE    Collection Time: 09/04/21 12:06 AM   Result Value Ref Range    Sodium 140 136 - 145 mmol/L    Potassium 4.3 3.5 - 5.1 mmol/L    Chloride 104 98 - 107 mmol/L    CO2 31 21 - 32 mmol/L    Anion gap 5 (L) 7 - 16 mmol/L    Glucose 117 (H) 65 - 100 mg/dL    BUN 17 8 - 23 MG/DL    Creatinine 0.98 0.8 - 1.5 MG/DL    GFR est AA >60 >60 ml/min/1.73m2    GFR est non-AA >60 >60 ml/min/1.73m2    Calcium 9.9 8.3 - 10.4 MG/DL    Bilirubin, total 0.6 0.2 - 1.1 MG/DL    ALT (SGPT) 19 12 - 65 U/L    AST (SGOT) 15 15 - 37 U/L    Alk.  phosphatase 103 50 - 136 U/L    Protein, total 7.7 6.3 - 8.2 g/dL    Albumin 4.2 3.2 - 4.6 g/dL    Globulin 3.5 2.3 - 3.5 g/dL    A-G Ratio 1.2 1.2 - 3.5     LIPASE    Collection Time: 09/04/21 12:06 AM   Result Value Ref Range    Lipase 48 (L) 73 - 475 U/L   METABOLIC PANEL, BASIC    Collection Time: 09/04/21  5:10 AM   Result Value Ref Range    Sodium 138 136 - 145 mmol/L    Potassium 4.2 3.5 - 5.1 mmol/L    Chloride 104 98 - 107 mmol/L    CO2 31 21 - 32 mmol/L    Anion gap 3 (L) 7 - 16 mmol/L    Glucose 146 (H) 65 - 100 mg/dL    BUN 13 8 - 23 MG/DL    Creatinine 0.98 0.8 - 1.5 MG/DL    GFR est AA >60 >60 ml/min/1.73m2    GFR est non-AA >60 >60 ml/min/1.73m2    Calcium 9.3 8.3 - 10.4 MG/DL       All Micro Results     None          Other Studies:  XR ABD (KUB)    Result Date: 9/4/2021  EXAM: Abdomen x-rays. INDICATION: Enteric tube placement. COMPARISON: Earlier CT abdomen on the same day. TECHNIQUE: 2 views. FINDINGS: The enteric tube tip and its distal side port lie in the proximal stomach. No free air is identified. Again noted are distended small bowel loops. As above. CT CHEST ABD PELV W CONT    Result Date: 9/4/2021  EXAM: CT chest, abdomen and pelvis with IV contrast. INDICATION: Pain. History of colon cancer. COMPARISON: Prior PET/CT scan on December 12, 2019. TECHNIQUE: Axial CT images of the chest, abdomen and pelvis were obtained after the intravenous injection of 100 mL Isovue 370 CT contrast. Radiation dose reduction techniques were used for this study. Our CT scanners use one or all of the following:  Automated exposure control, adjustment of the mA and/or kV according to patient size, iterative reconstruction. FINDINGS: - Pleura/pericardium: Within normal limits. - Lungs: Numerous bilateral lung nodules have progressed in size. For example, a nodule in the right lower lobe has increased in size from 1.9 to 2.5 cm. A bilobed nodule in the left lower lobe has increased in size from 2.5 x 1.6 cm to 4.7 x 2.2 cm. - Martha/Mediastinum: Within normal limits. - Tracheobronchial tree: Within normal limits. - Aorta/pulmonary arteries: Within normal limits. - Heart: Within normal limits. - Coronary arteries: No coronary artery calcifications. - Chest wall: There is a left chest wall infusion port catheter. - Spine/bones: No acute process. - Additional comments: None. - Liver: Within normal limits. - Gallbladder and bile ducts: Postcholecystectomy. - Spleen: Within normal limits. - Urinary tract: There are small bilateral kidney cysts, measuring up to 3.5 cm on the left.  No hydronephrosis is seen. The prostate gland is mildly enlarged. - Adrenals: Within normal limits. - Pancreas: Within normal limits. - Gastrointestinal tract: There are multiple mildly dilated and fluid-filled small bowel loops, measuring up to 3.3 cm in diameter. Decompressed bowel loops are seen in the right abdomen and pelvis. Findings are concerning for a small bowel obstruction, although a discrete transition point is not clearly identified. There is a sigmoid colon suture anastomosis, with new mild wall thickening in the sigmoid colon just distal to the anastomosis. - Retroperitoneum: Within normal limits. - Peritoneal cavity and abdominal wall: No ascites or free intraperitoneal air. - Pelvis: Within normal limits. - Spine/bones: No acute process. - Other comments: None. 1. Findings concerning for a small bowel obstruction, although the exact transition point is not clearly identified. 2. New mild wall thickening in the sigmoid colon just distal to a suture anastomosis. This could be artifactual as this segment of the colon is somewhat collapsed. Follow-up colonoscopy is recommended to exclude neoplasm if not recently performed. 3. Progressed bilateral lung metastases.        Current Meds:  Current Facility-Administered Medications   Medication Dose Route Frequency    0.9% sodium chloride infusion  75 mL/hr IntraVENous CONTINUOUS    sodium chloride (NS) flush 5-40 mL  5-40 mL IntraVENous Q8H    sodium chloride (NS) flush 5-40 mL  5-40 mL IntraVENous PRN    acetaminophen (TYLENOL) tablet 650 mg  650 mg Oral Q6H PRN    Or    acetaminophen (TYLENOL) suppository 650 mg  650 mg Rectal Q6H PRN    promethazine (PHENERGAN) tablet 12.5 mg  12.5 mg Oral Q6H PRN    Or    ondansetron (ZOFRAN) injection 4 mg  4 mg IntraVENous Q6H PRN    enoxaparin (LOVENOX) injection 40 mg  40 mg SubCUTAneous DAILY    HYDROmorphone (DILAUDID) injection 1 mg  1 mg IntraVENous Q4H PRN       Signed:  Brea Singh MD    Part of this note may have been written by using a voice dictation software. The note has been proof read but may still contain some grammatical/other typographical errors.

## 2021-09-04 NOTE — H&P
[facility]  History and Physical    Patient ID:  Soraida Jones  male  1950  [id number]    Admission Date: 9/3/2021  Chief Complaint: Abdominal pain  Reason for Admission: SBO    ASSESSMENT & PLAN:    [facility] Problems    Diagnosis Date Noted    SBO (small bowel obstruction) ([facility] [address] 09/04/2021    Colon cancer metastasized to lung ([facility] [address] 09/04/2021     Principal Problem:    SBO (small bowel obstruction) ([facility] [address] (9/4/2021)  NG tube placed, consult surgery, keep n.p.o., pain control, IV fluids    Active Problems:    Colon cancer metastasized to lung [facility]) (9/4/2021)  He sees oncology at [facility], will not consult our oncologist unless it becomes necessary. Disposition: admit to inpatient, MedSurg  Diet: N.p.o.  VTE ppx: Lovenox   CODE STATUS: Full    Surrogate decision-maker: Spouse    HISTORY OF PRESENT ILLNESS:  Patient was discussed with the ER provider prior to seeing the patient. Patient is a 80 y.o. male with who presented to the ED with ongoing and worsening abdominal pain. He reports a history of colon cancer with metastasis to the lung. He did have surgery with a colon resection about 5 years ago for the colon cancer. They believe that the colon cancer has been controlled. However, he does have metastasis to the lungs which appears to be worsening. He does not currently have any respiratory or chest complaints. He states that abdominal pain is worst in the epigastric area but does not radiate to his back. States it generally comes and goes with sharp exacerbations. He has had abdominal pain for about the last 2 months, most every day. States is worsened over the last 2 weeks, and particularly over the last couple of days. He was started on Pepcid about a month ago without any relief. He has not found anything makes pain better or worse. He has had nausea but no vomiting. He denies fever/chills, change in bowel movements, chest pain, shortness of breath.     REVIEW OF SYSTEMS:  A 14 point review of systems was taken and pertinent positive and negative as per HPI. Allergies   Allergen Reactions    No Known Allergies Other (comments)       Prior to Admission Medications   Prescriptions Last Dose Informant Patient Reported? Taking? Lactobac no.41/Bifidobact no.7 (PROBIOTIC-10 PO)   Yes No   Sig: Take 1 Tab by mouth daily. ascorbic acid, vitamin C, (VITAMIN C) 250 mg tablet   Yes No   Sig: Take  by mouth daily. cholecalciferol, vitamin D3, (VITAMIN D3) 2,000 unit tab   Yes No   Sig: Take 2,000 Units by mouth daily. famotidine (Pepcid) 20 mg tablet   Yes Yes   Sig: Take 20 mg by mouth two (2) times a day. multivitamin (ONE A DAY) tablet   Yes No   Sig: Take 1 Tab by mouth daily.       Facility-Administered Medications: None       Past Medical History:   Diagnosis Date    Allergic rhinitis     BPH (benign prostatic hyperplasia) 6/6/16    per pt- used to be on medication- off due to expense    Constipation     GERD (gastroesophageal reflux disease)     occ-- no meds    History of bleeding ulcers     Lung cancer ([facility] [address]     received treatment for 4 months in [place] - last treatment received in [facility] Malignant neoplasm of rectosigmoid (colon) ([facility] [address] 2016    surg and chemo    Port-A-Cath in place     Left chest port    PUD (peptic ulcer disease) yrs ago    Bleeding ulcer    Stool color black     hx of     Past Surgical History:   Procedure Laterality Date    COLONOSCOPY N/A 6/10/2016    COLONOSCOPY performed by Richy Parkinson MD at [facility] COLONOSCOPY N/A 3/13/2017    Repeat in 3 years; COLONOSCOPY/ 27 performed by Patt Brito MD at [facility] N/A 3/9/2020    COLONOSCOPY/ BMI 25  performed by Deja Arroyo MD at [place] [address] N/A 6/13/2016    SIGMOIDOSCOPY FLEXIBLE performed by Richy Parkinson MD at [address] TOTAL COLECTOMY  2016 Laparoscopic-assisted sigmoid colectomy with laparoscopic    HX VASCULAR ACCESS      port placed--left    NE COLONOSCOPY FLX DX W/COLLJ SPEC WHEN PFRMD  03/13/2017    Dr. Dana Rodrigez in 3 years.  NE COLONOSCOPY W/BIOPSY SINGLE/MULTIPLE  3/9/2020            Social History     Tobacco Use    Smoking status: Never Smoker    Smokeless tobacco: Never Used   Substance Use Topics    Alcohol use: No     Alcohol/week: 0.0 standard drinks       FAMILY HISTORY:  Reviewed and non-contributory to admitting problem, unless otherwise stated in the HPI    Family History   Problem Relation Age of Onset    Cancer Brother              PHYSICAL EXAM:    Patient Vitals for the past 24 hrs:   Temp Pulse Resp BP SpO2   09/04/21 0300 98.5 °F (36.9 °C)  20 (!) 167/96 93 %   09/04/21 0240  88  (!) 164/98 (!) 89 %   09/04/21 0144  86  (!) 183/100 95 %   09/04/21 0139  82   97 %   09/04/21 0030  85  (!) 158/88 91 %   09/04/21 0003    (!) 206/98    09/03/21 2333 98.5 °F (36.9 °C) 87 20 (!) 205/101 98 %     Visit Vitals  BP (!) 167/96   Pulse 88   Temp 98.5 °F (36.9 °C)   Resp 20   Ht 5' 11\" (1.803 m)   Wt 74.8 kg (165 lb)   SpO2 93%   BMI 23.01 kg/m²       General:    WD and WN, No apparent distress. Chronically ill-appearing  Eyes:  PERRL; EOMI; sclera normal/non-icteric  HENT:  Normocephalic, without obvious abnormality; Oropharynx is clear with tacky mucous membranes   Resp:    Clear to auscultation bilaterally. Resp are even and unlabored  CVS/Heart: Regular rate and rhythm,  No LE edema    GI:    Soft, non-tender. Not distended. Bowel sounds normal.     Musc/SK: Muscle strength is appropriate for age/condition; No cyanosis.  No clubbing  Skin:  No obvious rash/lesions  Neurologic: CN II - XII are grossly intact - Eye exam as noted above; non focal  Psych: Alert and oriented x 4;  Judgement and insight are normal      Intake/Output last 3 shifts:  Date 09/03/21 0700 - 09/04/21 0659 09/04/21 0700 - 09/05/21 2313 Shift [id number] 1900-0659 24 Hour Total 3171-2806 9624-6420 24 Hour Total   INTAKE   I.V.  1100 1100        Volume (sodium chloride 0.9 % bolus infusion 1,000 mL)  1000 1000        Volume (sodium chloride 0.9 % bolus infusion 100 mL)  100 100      Shift Total(mL/kg)  1100(14.7) 1100(14.7)      OUTPUT   Shift Total(mL/kg)         NET  1100 1100      Weight (kg)  74.8 74.8 74.8 74.8 74.8       Labs:  CMP:   Lab Results   Component Value Date/Time     09/04/2021 12:06 AM    K 4.3 09/04/2021 12:06 AM     09/04/2021 12:06 AM    CO2 31 09/04/2021 12:06 AM    AGAP 5 (L) 09/04/2021 12:06 AM     (H) 09/04/2021 12:06 AM    BUN 17 09/04/2021 12:06 AM    CREA 0.98 09/04/2021 12:06 AM    GFRAA >60 09/04/2021 12:06 AM    GFRNA >60 09/04/2021 12:06 AM    CA 9.9 09/04/2021 12:06 AM    ALB 4.2 09/04/2021 12:06 AM    TBILI 0.6 09/04/2021 12:06 AM    TP 7.7 09/04/2021 12:06 AM    GLOB 3.5 09/04/2021 12:06 AM    AGRAT 1.2 09/04/2021 12:06 AM    ALT 19 09/04/2021 12:06 AM         CBC:    Lab Results   Component Value Date/Time    WBC 10.6 09/04/2021 12:06 AM    HGB 15.5 09/04/2021 12:06 AM    HCT 45.9 09/04/2021 12:06 AM     09/04/2021 12:06 AM       No results found for: INR, PTMR, PTP, PT1, PT2, INREXT    ABG:  No results found for: PH, PHI, PCO2, PCO2I, PO2, PO2I, HCO3, HCO3I, FIO2, FIO2I        Lab Results   Component Value Date/Time    Troponin-I, Qt. <0.04 06/09/2016 08:50 PM       Imaging & Other Studies:  CT CHEST ABD PELV W CONT    Result Date: 9/4/2021  EXAM: CT chest, abdomen and pelvis with IV contrast. INDICATION: Pain. History of colon cancer. COMPARISON: Prior PET/CT scan on December 12, 2019. TECHNIQUE: Axial CT images of the chest, abdomen and pelvis were obtained after the intravenous injection of 100 mL Isovue 370 CT contrast. Radiation dose reduction techniques were used for this study.   Our CT scanners use one or all of the following:  Automated exposure control, adjustment of the mA and/or kV according to patient size, iterative reconstruction. FINDINGS: - Pleura/pericardium: Within normal limits. - Lungs: Numerous bilateral lung nodules have progressed in size. For example, a nodule in the right lower lobe has increased in size from 1.9 to 2.5 cm. A bilobed nodule in the left lower lobe has increased in size from 2.5 x 1.6 cm to 4.7 x 2.2 cm. - Martha/Mediastinum: Within normal limits. - Tracheobronchial tree: Within normal limits. - Aorta/pulmonary arteries: Within normal limits. - Heart: Within normal limits. - Coronary arteries: No coronary artery calcifications. - Chest wall: There is a left chest wall infusion port catheter. - Spine/bones: No acute process. - Additional comments: None. - Liver: Within normal limits. - Gallbladder and bile ducts: Postcholecystectomy. - Spleen: Within normal limits. - Urinary tract: There are small bilateral kidney cysts, measuring up to 3.5 cm on the left. No hydronephrosis is seen. The prostate gland is mildly enlarged. - Adrenals: Within normal limits. - Pancreas: Within normal limits. - Gastrointestinal tract: There are multiple mildly dilated and fluid-filled small bowel loops, measuring up to 3.3 cm in diameter. Decompressed bowel loops are seen in the right abdomen and pelvis. Findings are concerning for a small bowel obstruction, although a discrete transition point is not clearly identified. There is a sigmoid colon suture anastomosis, with new mild wall thickening in the sigmoid colon just distal to the anastomosis. - Retroperitoneum: Within normal limits. - Peritoneal cavity and abdominal wall: No ascites or free intraperitoneal air. - Pelvis: Within normal limits. - Spine/bones: No acute process. - Other comments: None. 1. Findings concerning for a small bowel obstruction, although the exact transition point is not clearly identified. 2. New mild wall thickening in the sigmoid colon just distal to a suture anastomosis.  This could be artifactual as this segment of the colon is somewhat collapsed. Follow-up colonoscopy is recommended to exclude neoplasm if not recently performed. 3. Progressed bilateral lung metastases.       EKG Results     None          Active Problems:  Patient Active Problem List    Diagnosis Date Noted    SBO (small bowel obstruction) ([address] 09/04/2021    Colon cancer metastasized to lung ([address] 09/04/2021    Lung mass 05/07/2019    Pulmonary nodules 05/03/2019    Malignant neoplasm of rectosigmoid (colon) ([address] 06/14/2016    Biliary gastritis 06/14/2016    Colonic obstruction ([address] 06/10/2016    Allergic rhinitis          Ildefonso Yanez MD  [employer] Hospitalist [facility]  9/4/2021 3:07 AM

## 2021-09-04 NOTE — PROGRESS NOTES
TRANSFER - IN REPORT:    Verbal report received from Michelle You RN on [facility]  being received from ED for routine progression of care      Report consisted of patients Situation, Background, Assessment and   Recommendations(SBAR). Information from the following report(s) SBAR was reviewed with the receiving nurse. Opportunity for questions and clarification was provided.

## 2021-09-04 NOTE — ED PROVIDER NOTES
Chief complaint : Abdominal pain    HISTORY OF PRESENT ILLNESS :  Location : Epigastric, no radiation to back    Quality : Sharp    Quantity : Intermittent, mostly at night    Timing : 2 months, daily, worsening over the last 2 weeks    Severity : Moderate    Context : History of colon cancer status post total colectomy,  Is actually due for a CT chest abdomen pelvis next week with IV and oral contrast    Alleviating / exacerbating factors : None  Placed on Pepcid about a month ago with no relief, no trials of antacids or other remedies    Associated Symptoms : Nausea but no vomiting             Past Medical History:   Diagnosis Date    Allergic rhinitis     BPH (benign prostatic hyperplasia) 6/6/16    per pt- used to be on medication- off due to expense    Constipation     GERD (gastroesophageal reflux disease)     occ-- no meds    History of bleeding ulcers     Lung cancer ([facility] [address]     received treatment for 4 months in [place] - last treatment received in [place] [id number] Malignant neoplasm of rectosigmoid (colon) ([facility] [address] 2016    surg and chemo    Port-A-Cath in place     Left chest port    PUD (peptic ulcer disease) yrs ago    Bleeding ulcer    Stool color black     hx of       Past Surgical History:   Procedure Laterality Date    COLONOSCOPY N/A 6/10/2016    COLONOSCOPY performed by Remedios Adam MD at [facility] ENDOSCOPY    COLONOSCOPY N/A 3/13/2017    Repeat in 3 years; COLONOSCOPY/ 27 performed by Juan Baldwin MD at [facility] N/A 3/9/2020    COLONOSCOPY/ BMI 25  performed by Muriel Litten, MD at [address] [place] [address] N/A 6/13/2016    SIGMOIDOSCOPY FLEXIBLE performed by Remedios Adam MD at [facility] ENDOSCOPY    HX CHOLECYSTECTOMY      HX TOTAL COLECTOMY  2016    Laparoscopic-assisted sigmoid colectomy with laparoscopic    HX VASCULAR ACCESS      port placed--left    [place] COLONOSCOPY FLX DX W/COLLJ SPEC WHEN PFRMD  03/13/2017    Dr. Ebenezer Hernadez in 3 years.  CO COLONOSCOPY W/BIOPSY SINGLE/MULTIPLE  3/9/2020              Family History:   Problem Relation Age of Onset    Cancer Brother        Social History     Socioeconomic History    Marital status:      Spouse name: Not on file    Number of children: Not on file    Years of education: Not on file    Highest education level: Not on file   Occupational History    Not on file   Tobacco Use    Smoking status: Never Smoker    Smokeless tobacco: Never Used   Substance and Sexual Activity    Alcohol use: No     Alcohol/week: 0.0 standard drinks    Drug use: No    Sexual activity: Not on file   Other Topics Concern    Not on file   Social History Narrative    Not on file     Social Determinants of Health     Financial Resource Strain:     Difficulty of Paying Living Expenses:    Food Insecurity:     Worried About Running Out of Food in the Last Year:     [address] in the Last Year:    Transportation Needs:     Lack of Transportation (Medical):  Lack of Transportation (Non-Medical):    Physical Activity:     Days of Exercise per Week:     Minutes of Exercise per Session:    Stress:     Feeling of Stress :    Social Connections:     Frequency of Communication with Friends and Family:     Frequency of Social Gatherings with Friends and Family:     Attends [employer] Services:     Active Member of Clubs or Organizations:     Attends Club or Organization Meetings:     Marital Status:    Intimate Partner Violence:     Fear of Current or Ex-Partner:     Emotionally Abused:     Physically Abused:     Sexually Abused: ALLERGIES: No known allergies    Review of Systems   Constitutional: Negative for chills and fever. HENT: Negative for rhinorrhea and sore throat. Eyes: Negative for discharge and redness. Respiratory: Negative for cough and shortness of breath. Cardiovascular: Negative for chest pain and palpitations.    Gastrointestinal: Positive for abdominal pain and nausea. Negative for diarrhea and vomiting. Genitourinary: Negative for difficulty urinating and dysuria. Musculoskeletal: Negative for arthralgias and back pain. Skin: Negative for rash. Neurological: Negative for dizziness and headaches. All other systems reviewed and are negative. Vitals:    09/03/21 2333   BP: (!) 205/101   Pulse: 87   Resp: 20   Temp: 98.5 °F (36.9 °C)   SpO2: 98%   Weight: 74.8 kg (165 lb)   Height: 5' 11\" (1.803 m)            Physical Exam  Vitals and nursing note reviewed. Constitutional:       General: He is not in acute distress. Appearance: Normal appearance. He is well-developed. He is not ill-appearing, toxic-appearing or diaphoretic. HENT:      Head: Normocephalic and atraumatic. Right Ear: External ear normal.      Left Ear: External ear normal.      Mouth/Throat:      Mouth: Mucous membranes are moist.      Pharynx: Oropharynx is clear. No oropharyngeal exudate or posterior oropharyngeal erythema. Eyes:      General: No scleral icterus. Right eye: No discharge. Left eye: No discharge. Extraocular Movements: Extraocular movements intact. Conjunctiva/sclera: Conjunctivae normal.      Pupils: Pupils are equal, round, and reactive to light. Neck:      Thyroid: No thyromegaly. Trachea: Trachea normal.   Cardiovascular:      Rate and Rhythm: Normal rate and regular rhythm. Heart sounds: Normal heart sounds. No murmur heard. No gallop. Pulmonary:      Effort: Pulmonary effort is normal. No respiratory distress. Breath sounds: Normal breath sounds. No wheezing or rales. Abdominal:      General: Bowel sounds are normal.      Palpations: Abdomen is soft. There is no hepatomegaly, splenomegaly or pulsatile mass. Tenderness: There is abdominal tenderness in the epigastric area. There is no guarding. Musculoskeletal:         General: Normal range of motion.       Cervical back: Normal range of motion and neck supple. Normal range of motion. Lymphadenopathy:      Cervical: No cervical adenopathy. Skin:     General: Skin is warm and dry. Neurological:      General: No focal deficit present. Mental Status: He is alert and oriented to person, place, and time. Mental status is at baseline. Motor: No abnormal muscle tone. Comments: cni 2-12 grossly   Psychiatric:         Mood and Affect: Mood normal.         Behavior: Behavior normal.          MDM  Number of Diagnoses or Management Options  Abdominal pain, epigastric: new and requires workup  Malignant neoplasm metastatic to lung, unspecified laterality [facility]): new and requires workup  Malignant neoplasm of colon, unspecified part of colon [facility]): new and requires workup  SBO (small bowel obstruction) [facility]): new and requires workup  Diagnosis management comments: Medical decision making note:  2 months epigastric pain and colon cancer patient, roughly 6 years status post total colectomy. Check labs, and CT abdomen pelvis, since he is due for his CT chest abdomen pelvis soon, we will scan his chest as well  This concludes the \"medical decision making note\" part of this emergency department visit note.          Amount and/or Complexity of Data Reviewed  Clinical lab tests: reviewed and ordered (Results Include:    Recent Results (from the past 24 hour(s))  -CBC WITH AUTOMATED DIFF  Collection Time: 09/04/21 12:06 AM       Result                      Value             Ref Range           WBC                         10.6              4.3 - 11.1 K*       RBC                         4.81              4.23 - 5.6 M*       HGB                         15.5              13.6 - 17.2 *       HCT                         45.9              41.1 - 50.3 %       MCV                         95.4              79.6 - 97.8 *       MCH                         32.2              26.1 - 32.9 *       MCHC                        33.8              31.4 - 35.0 *       RDW 12.6              11.9 - 14.6 %       PLATELET                    168               150 - 450 K/*       MPV                         10.3              9.4 - 12.3 FL       ABSOLUTE NRBC               0.00              0.0 - 0.2 K/*       DF                          AUTOMATED                             NEUTROPHILS                 84 (H)            43 - 78 %           LYMPHOCYTES                 9 (L)             13 - 44 %           MONOCYTES                   5                 4.0 - 12.0 %        EOSINOPHILS                 1                 0.5 - 7.8 %         BASOPHILS                   0                 0.0 - 2.0 %         IMMATURE GRANULOCYTES       1                 0.0 - 5.0 %         ABS. NEUTROPHILS            8.9 (H)           1.7 - 8.2 K/*       ABS. LYMPHOCYTES            0.9               0.5 - 4.6 K/*       ABS. MONOCYTES              0.6               0.1 - 1.3 K/*       ABS. EOSINOPHILS            0.1               0.0 - 0.8 K/*       ABS. BASOPHILS              0.0               0.0 - 0.2 K/*       ABS. IMM.  GRANS.            0.1               0.0 - 0.5 K/*  -METABOLIC PANEL, COMPREHENSIVE  Collection Time: 09/04/21 12:06 AM       Result                      Value             Ref Range           Sodium                      140               136 - 145 mm*       Potassium                   4.3               3.5 - 5.1 mm*       Chloride                    104               98 - 107 mmo*       CO2                         31                21 - 32 mmol*       Anion gap                   5 (L)             7 - 16 mmol/L       Glucose                     117 (H)           65 - 100 mg/*       BUN                         17                8 - 23 MG/DL        Creatinine                  0.98              0.8 - 1.5 MG*       GFR est AA                  >60               >60 ml/min/1*       GFR est non-AA              >60               >60 ml/min/1*       Calcium                     9.9 8.3 - 10.4 M*       Bilirubin, total            0.6               0.2 - 1.1 MG*       ALT (SGPT)                  19                12 - 65 U/L         AST (SGOT)                  15                15 - 37 U/L         Alk. phosphatase            103               50 - 136 U/L        Protein, total              7.7               6.3 - 8.2 g/*       Albumin                     4.2               3.2 - 4.6 g/*       Globulin                    3.5               2.3 - 3.5 g/*       A-G Ratio                   1.2               1.2 - 3.5      -LIPASE  Collection Time: 09/04/21 12:06 AM       Result                      Value             Ref Range           Lipase                      48 (L)            73 - 393 U/L   )  Tests in the radiology section of CPT®: ordered and reviewed (CT CHEST ABD PELV W CONT   Final Result    1. Findings concerning for a small bowel obstruction, although the exact transition point is not clearly identified. 2. New mild wall thickening in the sigmoid colon just distal to a suture anastomosis. This could be artifactual as this segment of the colon is somewhat collapsed. Follow-up colonoscopy is recommended to exclude neoplasm if not recently performed.     3. Progressed bilateral lung metastases.         )  Decide to obtain previous medical records or to obtain history from someone other than the patient: yes  Obtain history from someone other than the patient: yes (Daughter at bedside)  Discuss the patient with other providers: yes (Hospitalist service)    Risk of Complications, Morbidity, and/or Mortality  Presenting problems: moderate  Diagnostic procedures: low  Management options: low    Patient Progress  Patient progress: improved         Procedures

## 2021-09-04 NOTE — ED NOTES
TRANSFER - OUT REPORT:    Verbal report given to Med Surg RN  on [facility]  being transferred to 3rd floor 354 for routine progression of care       Report consisted of patients Situation, Background, Assessment and   Recommendations(SBAR). Information from the following report(s) SBAR was reviewed with the receiving nurse. Lines:   Venous Access Device Port a cath 08/17/16 Upper chest (subclavicular area), left (Active)       Venous Access Device Upper chest (subclavicular area), left (Active)       Peripheral IV 09/03/21 Right Antecubital (Active)        Opportunity for questions and clarification was provided.       Patient transported with:   Registered Nurse

## 2021-09-04 NOTE — PROGRESS NOTES
Pt received to room 354 from ED. AOx4. VSS on RA. NGT to JERRY rose connect to LIWS. Placement verified. Pts daughter at bedside to help with admission database. Oriented to room. Opportunity given to ask questions and all questions answered. Call light within reach.

## 2021-09-05 LAB
ANION GAP SERPL CALC-SCNC: 2 MMOL/L (ref 7–16)
BUN SERPL-MCNC: 11 MG/DL (ref 8–23)
CALCIUM SERPL-MCNC: 8.3 MG/DL (ref 8.3–10.4)
CHLORIDE SERPL-SCNC: 106 MMOL/L (ref 98–107)
CO2 SERPL-SCNC: 32 MMOL/L (ref 21–32)
CREAT SERPL-MCNC: 0.72 MG/DL (ref 0.8–1.5)
GLUCOSE SERPL-MCNC: 104 MG/DL (ref 65–100)
MAGNESIUM SERPL-MCNC: 2 MG/DL (ref 1.8–2.4)
POTASSIUM SERPL-SCNC: 3.6 MMOL/L (ref 3.5–5.1)
SODIUM SERPL-SCNC: 140 MMOL/L (ref 136–145)

## 2021-09-05 PROCEDURE — 74011250636 HC RX REV CODE- 250/636: Performed by: FAMILY MEDICINE

## 2021-09-05 PROCEDURE — 74011250637 HC RX REV CODE- 250/637: Performed by: FAMILY MEDICINE

## 2021-09-05 PROCEDURE — 36415 COLL VENOUS BLD VENIPUNCTURE: CPT

## 2021-09-05 PROCEDURE — 80048 BASIC METABOLIC PNL TOTAL CA: CPT

## 2021-09-05 PROCEDURE — 65270000029 HC RM PRIVATE

## 2021-09-05 PROCEDURE — 83735 ASSAY OF MAGNESIUM: CPT

## 2021-09-05 RX ADMIN — PROMETHAZINE HYDROCHLORIDE 12.5 MG: 25 TABLET ORAL at 22:02

## 2021-09-05 RX ADMIN — HYDROMORPHONE HYDROCHLORIDE 1 MG: 1 INJECTION, SOLUTION INTRAMUSCULAR; INTRAVENOUS; SUBCUTANEOUS at 03:05

## 2021-09-05 RX ADMIN — Medication 10 ML: at 06:27

## 2021-09-05 RX ADMIN — Medication 10 ML: at 14:30

## 2021-09-05 RX ADMIN — HYDROMORPHONE HYDROCHLORIDE 1 MG: 1 INJECTION, SOLUTION INTRAMUSCULAR; INTRAVENOUS; SUBCUTANEOUS at 22:04

## 2021-09-05 RX ADMIN — ENOXAPARIN SODIUM 40 MG: 60 INJECTION SUBCUTANEOUS at 08:16

## 2021-09-05 RX ADMIN — Medication 10 ML: at 22:00

## 2021-09-05 RX ADMIN — ACETAMINOPHEN 650 MG: 325 TABLET, FILM COATED ORAL at 13:00

## 2021-09-05 NOTE — PROGRESS NOTES
Hospitalist Progress Note   Admit Date:  9/3/2021 11:29 PM   Name:  Deja Haney   Age:  80 y.o. Sex:  male  :  1950   MRN:  [id number]     Presenting Complaint: Abdominal Pain    Reason(s) for Admission: SBO (small bowel obstruction) [facility]) [facility] Course & Interval History:   Patient presented with abdominal pain. Patient with past medical history of    Colon cancer with s/p resection and chemotherapy. He has had metastasis to the lungs. He has had abdominal pain off and on for the past 2 months and worsened in the past 2 weeks. Much worse in the past couple of days. He is found to have bowel obstruction from CT abdomen and pelvis. He is admitted and is being treated with IV fluid, NPO and NG tube placement. Subjective (21):  21   No flatus. No bowel movement since admission. No fever. No shaking. No chills. No shortness of breath. No chest pain. 21   Patient reports having had some flatus. He is feeling more comfortable. No fever. No shaking. No chills. On NG tube still. Assessment & Plan:     Principal Problem:    SBO (small bowel obstruction) ([facility] [address] (2021)  Continue with NPO. IV fluid. General surgery is consulted and following. Symptomatic treatments   Monitor symptoms and will repeat imaging study later. Active Problems:    Lung mass (2019)  From colon cancer metastasis. Will continue treatment with his oncologist as out-patient. Colon cancer metastasized to lung [facility]) (2021)    I have discussed the plan of care with patient.        Dispo/Discharge Planning:    to be determined     Diet:  DIET NPO  DVT PPx: Lovenox SC   Code status: Full Code    Active Hospital Problems    Diagnosis Date Noted    SBO (small bowel obstruction) ([address] 2021    Colon cancer metastasized to lung ([facility] [address] 2021    Lung mass 2019       Objective:     Patient Vitals for the past 24 hrs:   Temp Pulse Resp BP SpO2   09/05/21 0730 97.8 °F (36.6 °C) (!) 58 18 (!) 172/91 96 %   09/05/21 0310 98.3 °F (36.8 °C) 61 16 (!) 148/92 91 %   09/04/21 2325 98 °F (36.7 °C) (!) 55 18 (!) 163/82 96 %   09/04/21 2124 98.1 °F (36.7 °C) (!) 58 20 (!) 157/87 95 %   09/04/21 1518 98.7 °F (37.1 °C) (!) 59 18 136/81 96 %   09/04/21 1219 98.3 °F (36.8 °C) 69 14 (!) 149/91 95 %     Oxygen Therapy  O2 Sat (%): 96 % (09/05/21 0730)  Pulse via Oximetry: 88 beats per minute (09/04/21 0240)  O2 Device: None (Room air) (09/04/21 0754)    Estimated body mass index is 23.01 kg/m² as calculated from the following:    Height as of this encounter: 5' 11\" (1.803 m). Weight as of this encounter: 74.8 kg (165 lb). Intake/Output Summary (Last 24 hours) at 9/5/2021 0943  Last data filed at 9/5/2021 0316  Gross per 24 hour   Intake    Output 1270 ml   Net -1270 ml         Physical Exam:     Visit Vitals  BP (!) 172/91 (BP 1 Location: Left upper arm, BP Patient Position: Lying) Comment: Nurse notified   Pulse (!) 58   Temp 97.8 °F (36.6 °C)   Resp 18   Ht 5' 11\" (1.803 m)   Wt 74.8 kg (165 lb)   SpO2 96%   BMI 23.01 kg/m²        General:    Well nourished. No overt distress. Patient is siting up in bed. NG tube in place. Head:  Normocephalic, atraumatic  Eyes:  Sclerae appear normal.  Pupils equally round. ENT:  Nares appear normal, no drainage. Moist oral mucosa  Neck:  No restricted ROM. Trachea midline   CV:   RRR. No m/r/g. No jugular venous distension. Lungs:   CTAB. No wheezing, rhonchi, or rales. Respirations even, unlabored  Abdomen: Bowel sounds present. Soft, nontender, nondistended. Previous surgical scars. NG tube in place. Extremities: No cyanosis or clubbing. No edema  Skin:     No rashes and normal coloration. Warm and dry. Neuro:  Cranial nerves II-XII grossly intact. Sensation intact  Psych:  Normal mood and affect.   Alert and oriented x3    I have reviewed ordered lab tests and independently visualized imaging below:    Last 24hr Labs:  Recent Results (from the past 24 hour(s))   METABOLIC PANEL, BASIC    Collection Time: 09/05/21  4:23 AM   Result Value Ref Range    Sodium 140 136 - 145 mmol/L    Potassium 3.6 3.5 - 5.1 mmol/L    Chloride 106 98 - 107 mmol/L    CO2 32 21 - 32 mmol/L    Anion gap 2 (L) 7 - 16 mmol/L    Glucose 104 (H) 65 - 100 mg/dL    BUN 11 8 - 23 MG/DL    Creatinine 0.72 (L) 0.8 - 1.5 MG/DL    GFR est AA >60 >60 ml/min/1.73m2    GFR est non-AA >60 >60 ml/min/1.73m2    Calcium 8.3 8.3 - 10.4 MG/DL   MAGNESIUM    Collection Time: 09/05/21  4:23 AM   Result Value Ref Range    Magnesium 2.0 1.8 - 2.4 mg/dL       All Micro Results     None          Other Studies:  No results found. Current Meds:  Current Facility-Administered Medications   Medication Dose Route Frequency    sodium chloride (NS) flush 5-40 mL  5-40 mL IntraVENous Q8H    sodium chloride (NS) flush 5-40 mL  5-40 mL IntraVENous PRN    acetaminophen (TYLENOL) tablet 650 mg  650 mg Oral Q6H PRN    Or    acetaminophen (TYLENOL) suppository 650 mg  650 mg Rectal Q6H PRN    promethazine (PHENERGAN) tablet 12.5 mg  12.5 mg Oral Q6H PRN    Or    ondansetron (ZOFRAN) injection 4 mg  4 mg IntraVENous Q6H PRN    enoxaparin (LOVENOX) injection 40 mg  40 mg SubCUTAneous DAILY    HYDROmorphone (DILAUDID) injection 1 mg  1 mg IntraVENous Q4H PRN       Signed:  Dewayne Fonseca MD    Part of this note may have been written by using a voice dictation software. The note has been proof read but may still contain some grammatical/other typographical errors.

## 2021-09-05 NOTE — PROGRESS NOTES
SABINA met with patient and his daughter Rafael Willis ([phone]). Patient states that he lives with his wife and confirmed demographic information. He is seen by Dr. Collin Starr for primary care and is current. Patient does not use assistive devices at home, does not require ADL assistance, and denies any falls. Patient denies any current feelings of weakness or unbalance. Anticipate home with family at discharge pending medical progress. Notes consult to surgery in chart. SABINA following.      Brigitte Carreno LMSW    [facility]    * [email or web address]

## 2021-09-06 ENCOUNTER — APPOINTMENT (OUTPATIENT)
Dept: GENERAL RADIOLOGY | Age: 71
DRG: 389 | End: 2021-09-06
Attending: INTERNAL MEDICINE
Payer: MEDICARE

## 2021-09-06 LAB
ANION GAP SERPL CALC-SCNC: 7 MMOL/L (ref 7–16)
BUN SERPL-MCNC: 18 MG/DL (ref 8–23)
CALCIUM SERPL-MCNC: 9.4 MG/DL (ref 8.3–10.4)
CHLORIDE SERPL-SCNC: 100 MMOL/L (ref 98–107)
CO2 SERPL-SCNC: 35 MMOL/L (ref 21–32)
CREAT SERPL-MCNC: 0.77 MG/DL (ref 0.8–1.5)
GLUCOSE SERPL-MCNC: 80 MG/DL (ref 65–100)
POTASSIUM SERPL-SCNC: 3.8 MMOL/L (ref 3.5–5.1)
SODIUM SERPL-SCNC: 142 MMOL/L (ref 136–145)

## 2021-09-06 PROCEDURE — 80048 BASIC METABOLIC PNL TOTAL CA: CPT

## 2021-09-06 PROCEDURE — 36415 COLL VENOUS BLD VENIPUNCTURE: CPT

## 2021-09-06 PROCEDURE — 74011250636 HC RX REV CODE- 250/636: Performed by: FAMILY MEDICINE

## 2021-09-06 PROCEDURE — 65270000029 HC RM PRIVATE

## 2021-09-06 PROCEDURE — 74018 RADEX ABDOMEN 1 VIEW: CPT

## 2021-09-06 RX ADMIN — Medication 10 ML: at 13:30

## 2021-09-06 RX ADMIN — ENOXAPARIN SODIUM 40 MG: 60 INJECTION SUBCUTANEOUS at 09:23

## 2021-09-06 RX ADMIN — HYDROMORPHONE HYDROCHLORIDE 1 MG: 1 INJECTION, SOLUTION INTRAMUSCULAR; INTRAVENOUS; SUBCUTANEOUS at 09:23

## 2021-09-06 RX ADMIN — Medication 10 ML: at 22:17

## 2021-09-06 RX ADMIN — Medication 10 ML: at 06:00

## 2021-09-06 NOTE — PROGRESS NOTES
Hospitalist Progress Note   Admit Date:  9/3/2021 11:29 PM   Name:  Holly Range   Age:  80 y.o. Sex:  male  :  1950   MRN:  [id number]     Presenting Complaint: Abdominal Pain    Reason(s) for Admission: SBO (small bowel obstruction) [facility]) [facility] Course & Interval History:   Patient presented with abdominal pain. Patient with past medical history of    Colon cancer with s/p resection and chemotherapy. He has had metastasis to the lungs. He has had abdominal pain off and on for the past 2 months and worsened in the past 2 weeks. Much worse in the past couple of days. He is found to have bowel obstruction from CT abdomen and pelvis. He is admitted and is being treated with IV fluid, NPO and NG tube placement. Subjective (21):  21   No flatus. No bowel movement since admission. No fever. No shaking. No chills. No shortness of breath. No chest pain. 21   Patient reports having had some flatus. He is feeling more comfortable. No fever. No shaking. No chills. On NG tube still. 2021  Patient reports having had flatus. Less abdominal pain. No fever, no chills. Continue to have gastric content being suctioned of via NG tube. Gastric content appears dark green. Assessment & Plan:     Principal Problem:    SBO (small bowel obstruction) ([address] (2021)  Continue with NPO. IV fluid. General surgery is consulted and following. Symptomatic treatments   Monitor symptoms and will repeat imaging today. Active Problems:    Lung mass (2019)  From colon cancer metastasis. Will continue treatment with his oncologist as out-patient. Colon cancer metastasized to lung [facility]) (2021)    I have discussed the plan of care with patient.        Dispo/Discharge Planning:    to be determined     Diet:  DIET NPO  DVT PPx: Lovenox SC   Code status: Full Code    Active Hospital Problems    Diagnosis Date Noted    SBO (small bowel obstruction) ([address] 09/04/2021    Colon cancer metastasized to lung ([address] 09/04/2021    Lung mass 05/07/2019       Objective:     Patient Vitals for the past 24 hrs:   Temp Pulse Resp BP SpO2   09/06/21 0820 98.8 °F (37.1 °C) (!) 57 17 (!) 155/86 93 %   09/06/21 0318 98.3 °F (36.8 °C) (!) 51 18 (!) 156/82 94 %   09/05/21 2332 98.3 °F (36.8 °C) 64 16 (!) 145/93 94 %   09/05/21 1930 98.9 °F (37.2 °C) 61 18 (!) 155/91 95 %   09/05/21 1751 98.6 °F (37 °C) 62 18 (!) 155/87 95 %   09/05/21 1130 (!) 96.6 °F (35.9 °C) (!) 56 18 (!) 176/88 97 %     Oxygen Therapy  O2 Sat (%): 93 % (09/06/21 0820)  Pulse via Oximetry: 88 beats per minute (09/04/21 0240)  O2 Device: None (Room air) (09/05/21 1935)    Estimated body mass index is 23.01 kg/m² as calculated from the following:    Height as of this encounter: 5' 11\" (1.803 m). Weight as of this encounter: 74.8 kg (165 lb). Intake/Output Summary (Last 24 hours) at 9/6/2021 1004  Last data filed at 9/5/2021 2336  Gross per 24 hour   Intake    Output 1850 ml   Net -1850 ml         Physical Exam:     Visit Vitals  BP (!) 155/86 (BP 1 Location: Right upper arm, BP Patient Position: Supine) Comment: notified Franci   Pulse (!) 57   Temp 98.8 °F (37.1 °C)   Resp 17   Ht 5' 11\" (1.803 m)   Wt 74.8 kg (165 lb)   SpO2 93%   BMI 23.01 kg/m²        General:    Well nourished. No overt distress. Patient is siting up in bed. NG tube in place. Head:  Normocephalic, atraumatic  Eyes:  Sclerae appear normal.  Pupils equally round. ENT:  Nares appear normal, no drainage. Moist oral mucosa  Neck:  No restricted ROM. Trachea midline   CV:   RRR. No m/r/g. No jugular venous distension. Lungs:   CTAB. No wheezing, rhonchi, or rales. Respirations even, unlabored  Abdomen: Bowel sounds present. Soft, nontender, less distended. Previous surgical scars. NG tube in place. Extremities: No cyanosis or clubbing. No edema  Skin:     No rashes and normal coloration. Warm and dry. Neuro:  Cranial nerves II-XII grossly intact. Sensation intact  Psych:  Normal mood and affect. Alert and oriented x3    I have reviewed ordered lab tests and independently visualized imaging below:    Last 24hr Labs:  Recent Results (from the past 24 hour(s))   METABOLIC PANEL, BASIC    Collection Time: 09/06/21  5:57 AM   Result Value Ref Range    Sodium 142 136 - 145 mmol/L    Potassium 3.8 3.5 - 5.1 mmol/L    Chloride 100 98 - 107 mmol/L    CO2 35 (H) 21 - 32 mmol/L    Anion gap 7 7 - 16 mmol/L    Glucose 80 65 - 100 mg/dL    BUN 18 8 - 23 MG/DL    Creatinine 0.77 (L) 0.8 - 1.5 MG/DL    GFR est AA >60 >60 ml/min/1.73m2    GFR est non-AA >60 >60 ml/min/1.73m2    Calcium 9.4 8.3 - 10.4 MG/DL       All Micro Results     None          Other Studies:  XR ABD (KUB)    Result Date: 9/6/2021  KUB INDICATION:   Follow-up bowel obstruction Supine views of the abdomen were obtained. Compared with 09/04/2021. FINDINGS: Tip of the nasogastric tube remains in stomach. There is no significant small bowel distention. Small amount of contrast is now noted: Qamar Vee There is no evidence of obstruction. No renal calculi are seen. No significant small bowel distention. Current Meds:  Current Facility-Administered Medications   Medication Dose Route Frequency    sodium chloride (NS) flush 5-40 mL  5-40 mL IntraVENous Q8H    sodium chloride (NS) flush 5-40 mL  5-40 mL IntraVENous PRN    acetaminophen (TYLENOL) tablet 650 mg  650 mg Oral Q6H PRN    Or    acetaminophen (TYLENOL) suppository 650 mg  650 mg Rectal Q6H PRN    promethazine (PHENERGAN) tablet 12.5 mg  12.5 mg Oral Q6H PRN    Or    ondansetron (ZOFRAN) injection 4 mg  4 mg IntraVENous Q6H PRN    enoxaparin (LOVENOX) injection 40 mg  40 mg SubCUTAneous DAILY    HYDROmorphone (DILAUDID) injection 1 mg  1 mg IntraVENous Q4H PRN       Signed:  Nirmal Knight MD    Part of this note may have been written by using a voice dictation software.   The note has been proof read but may still contain some grammatical/other typographical errors.

## 2021-09-06 NOTE — PROGRESS NOTES
Problem: Falls - Risk of  Goal: *Absence of Falls  Description: Document [place] Fall Risk and appropriate interventions in the flowsheet.   Outcome: Progressing Towards Goal  Note: Fall Risk Interventions:            Medication Interventions: Assess postural VS orthostatic hypotension, Patient to call before getting OOB, Teach patient to arise slowly                   Problem: Patient Education: Go to Patient Education Activity  Goal: Patient/Family Education  Outcome: Progressing Towards Goal

## 2021-09-07 VITALS
SYSTOLIC BLOOD PRESSURE: 138 MMHG | OXYGEN SATURATION: 96 % | HEIGHT: 71 IN | BODY MASS INDEX: 23.1 KG/M2 | TEMPERATURE: 97.9 F | HEART RATE: 54 BPM | DIASTOLIC BLOOD PRESSURE: 81 MMHG | WEIGHT: 165 LBS | RESPIRATION RATE: 17 BRPM

## 2021-09-07 PROCEDURE — 74011250636 HC RX REV CODE- 250/636: Performed by: FAMILY MEDICINE

## 2021-09-07 RX ADMIN — Medication 10 ML: at 13:23

## 2021-09-07 RX ADMIN — Medication 10 ML: at 05:50

## 2021-09-07 RX ADMIN — ENOXAPARIN SODIUM 40 MG: 60 INJECTION SUBCUTANEOUS at 10:19

## 2021-09-07 NOTE — DISCHARGE SUMMARY
Hospitalist Discharge Summary   Admit Date:  9/3/2021 11:29 PM   Name:  Jason Starkey   Age:  80 y.o. Sex:  male  :  1950   MRN:  [id number]   PCP:  Beather Hatchet, DO    Presenting Complaint: Abdominal Pain    Initial Admission Diagnosis: SBO (small bowel obstruction) ([facility] [K56.609]     Problem List for this Hospitalization:  Hospital Problems as of 2021 Date Reviewed: 2020        Codes Class Noted - Resolved POA    * (Principal) SBO (small bowel obstruction) ([facility] ICD-10-CM: O20.921  ICD-9-CM: 560.9  2021 - Present Yes        Colon cancer metastasized to lung [facility]) (Chronic) ICD-10-CM: C18.9, C78.00  ICD-9-CM: 153.9, 197.0  2021 - Present Yes        Lung mass ICD-10-CM: R91.8  ICD-9-CM: 786.6  2019 - Present Yes            Did Patient have Sepsis (YES OR NO): No       Hospital Course:    Patient presented with abdominal pain.      Patient with past medical history of    Colon cancer with s/p resection and chemotherapy. He has had metastasis to the lungs.      He has had abdominal pain off and on for the past 2 months and worsened in the past 2 weeks. Much worse in the past couple of days.      He is found to have bowel obstruction from CT abdomen and pelvis.      He is admitted and is treated with IV fluid, NPO and NG tube placement. Patient improved with conservative management. He has bowel movements. He has good flatus. Repeated abdominal x-ray shows no evidence of bowel obstruction. Patient was given clear liquid diet and he tolerates well. Patient is being discharged today in stable condition. Disposition: Home or Self Care  Diet: ADULT DIET Regular  Code Status: Full Code    Follow Up Orders: Follow-up Appointments   Procedures    FOLLOW UP VISIT Appointment in: 3 - 5 Days See your primary doctor. See your primary doctor.      Standing Status:   Standing     Number of Occurrences:   1     Order Specific Question:   Appointment in     Answer:   3 - 5 Days       Follow-up Information     Follow up With Specialties Details Why Contact Info    Yvonne Bull [facility]   [address] [place] [address] [place] [address]  [phone]            Time spent in patient discharge and coordination 35 minutes. Plan was discussed with patient. All questions answered. Patient was stable at time of discharge. Instructions given to call a physician or return if any concerns. Discharge Info:   Current Discharge Medication List      CONTINUE these medications which have NOT CHANGED    Details   famotidine (Pepcid) 20 mg tablet Take 20 mg by mouth two (2) times a day. ascorbic acid, vitamin C, (VITAMIN C) 250 mg tablet Take  by mouth daily. Lactobac no.41/Bifidobact no.7 (PROBIOTIC-10 PO) Take 1 Tab by mouth daily. multivitamin (ONE A DAY) tablet Take 1 Tab by mouth daily. cholecalciferol, vitamin D3, (VITAMIN D3) 2,000 unit tab Take 2,000 Units by mouth daily. Procedures done this admission:  * No surgery found *    Consults this admission:  IP CONSULT TO GENERAL SURGERY    Echocardiogram/EKG results:  No results found for this visit on 09/03/21. EKG Results     None          Diagnostic Imaging/Tests:   XR ABD (KUB)    Result Date: 9/6/2021  KUB INDICATION:   Follow-up bowel obstruction Supine views of the abdomen were obtained. Compared with 09/04/2021. FINDINGS: Tip of the nasogastric tube remains in stomach. There is no significant small bowel distention. Small amount of contrast is now noted: Marie Houston There is no evidence of obstruction. No renal calculi are seen. No significant small bowel distention. XR ABD (KUB)    Result Date: 9/4/2021  EXAM: Abdomen x-rays. INDICATION: Enteric tube placement. COMPARISON: Earlier CT abdomen on the same day. TECHNIQUE: 2 views. FINDINGS: The enteric tube tip and its distal side port lie in the proximal stomach. No free air is identified.  Again noted are distended small bowel loops. As above. CT CHEST ABD PELV W CONT    Result Date: 9/4/2021  EXAM: CT chest, abdomen and pelvis with IV contrast. INDICATION: Pain. History of colon cancer. COMPARISON: Prior PET/CT scan on December 12, 2019. TECHNIQUE: Axial CT images of the chest, abdomen and pelvis were obtained after the intravenous injection of 100 mL Isovue 370 CT contrast. Radiation dose reduction techniques were used for this study. Our CT scanners use one or all of the following:  Automated exposure control, adjustment of the mA and/or kV according to patient size, iterative reconstruction. FINDINGS: - Pleura/pericardium: Within normal limits. - Lungs: Numerous bilateral lung nodules have progressed in size. For example, a nodule in the right lower lobe has increased in size from 1.9 to 2.5 cm. A bilobed nodule in the left lower lobe has increased in size from 2.5 x 1.6 cm to 4.7 x 2.2 cm. - Martha/Mediastinum: Within normal limits. - Tracheobronchial tree: Within normal limits. - Aorta/pulmonary arteries: Within normal limits. - Heart: Within normal limits. - Coronary arteries: No coronary artery calcifications. - Chest wall: There is a left chest wall infusion port catheter. - Spine/bones: No acute process. - Additional comments: None. - Liver: Within normal limits. - Gallbladder and bile ducts: Postcholecystectomy. - Spleen: Within normal limits. - Urinary tract: There are small bilateral kidney cysts, measuring up to 3.5 cm on the left. No hydronephrosis is seen. The prostate gland is mildly enlarged. - Adrenals: Within normal limits. - Pancreas: Within normal limits. - Gastrointestinal tract: There are multiple mildly dilated and fluid-filled small bowel loops, measuring up to 3.3 cm in diameter. Decompressed bowel loops are seen in the right abdomen and pelvis. Findings are concerning for a small bowel obstruction, although a discrete transition point is not clearly identified.  There is a sigmoid colon suture anastomosis, with new mild wall thickening in the sigmoid colon just distal to the anastomosis. - Retroperitoneum: Within normal limits. - Peritoneal cavity and abdominal wall: No ascites or free intraperitoneal air. - Pelvis: Within normal limits. - Spine/bones: No acute process. - Other comments: None. 1. Findings concerning for a small bowel obstruction, although the exact transition point is not clearly identified. 2. New mild wall thickening in the sigmoid colon just distal to a suture anastomosis. This could be artifactual as this segment of the colon is somewhat collapsed. Follow-up colonoscopy is recommended to exclude neoplasm if not recently performed. 3. Progressed bilateral lung metastases. All Micro Results     None          Labs: Results:       BMP, Mg, Phos Recent Labs     09/06/21  0557 09/05/21  0423    140   K 3.8 3.6    106   CO2 35* 32   AGAP 7 2*   BUN 18 11   CREA 0.77* 0.72*   CA 9.4 8.3   GLU 80 104*   MG  --  2.0      CBC No results for input(s): WBC, RBC, HGB, HCT, PLT, GRANS, LYMPH, EOS, MONOS, BASOS, IG, ANEU, ABL, GRETCHEN, ABM, ABB, AIG, HGBEXT, HCTEXT, PLTEXT in the last 72 hours. LFT No results for input(s): ALT, TBIL, AP, TP, ALB, GLOB, AGRAT in the last 72 hours.     No lab exists for component: SGOT, GPT   Cardiac Testing Lab Results   Component Value Date/Time    Troponin-I, Qt. <0.04 06/09/2016 08:50 PM      Coagulation Tests No results found for: PTP, INR, APTT, INREXT   A1c Lab Results   Component Value Date/Time    Hemoglobin A1c 5.2 03/19/2020 09:01 AM      Lipid Panel Lab Results   Component Value Date/Time    Cholesterol, total 184 03/19/2020 09:01 AM    HDL Cholesterol 62 03/19/2020 09:01 AM    LDL, calculated 108 (H) 03/19/2020 09:01 AM    VLDL, calculated 14 03/19/2020 09:01 AM    Triglyceride 70 03/19/2020 09:01 AM      Thyroid Panel Lab Results   Component Value Date/Time    TSH 3.750 03/19/2020 09:01 AM    TSH 3.870 07/17/2018 08:40 AM        Most Recent UA No results found for: COLOR, APPRN, REFSG, FLORENCE, PROTU, GLUCU, KETU, BILU, BLDU, UROU, SANDEEP, LEUKU, WBCU, RBCU, UEPI, BACTU, CASTS, UCRY, MUCUS, UCOM       All Labs from Last 24 Hrs:  No results found for this or any previous visit (from the past 24 hour(s)). Current Med List in Hospital:   Current Facility-Administered Medications   Medication Dose Route Frequency    sodium chloride (NS) flush 5-40 mL  5-40 mL IntraVENous Q8H    sodium chloride (NS) flush 5-40 mL  5-40 mL IntraVENous PRN    acetaminophen (TYLENOL) tablet 650 mg  650 mg Oral Q6H PRN    Or    acetaminophen (TYLENOL) suppository 650 mg  650 mg Rectal Q6H PRN    promethazine (PHENERGAN) tablet 12.5 mg  12.5 mg Oral Q6H PRN    Or    ondansetron (ZOFRAN) injection 4 mg  4 mg IntraVENous Q6H PRN    enoxaparin (LOVENOX) injection 40 mg  40 mg SubCUTAneous DAILY    HYDROmorphone (DILAUDID) injection 1 mg  1 mg IntraVENous Q4H PRN       Allergies   Allergen Reactions    No Known Allergies Other (comments)     Immunization History   Administered Date(s) Administered    COVID-19, PFIZER, MRNA, LNP-S, PF, 30MCG/0.3ML DOSE 03/12/2021, 04/02/2021    Influenza High Dose Vaccine PF 10/10/2018       Recent Vital Data:  Patient Vitals for the past 24 hrs:   Temp Pulse Resp BP SpO2   09/07/21 0753 98.2 °F (36.8 °C) (!) 51 16 134/82 95 %   09/07/21 0308 98.1 °F (36.7 °C) (!) 53 16 (!) 123/56 95 %   09/06/21 2340 97.5 °F (36.4 °C) 65 16 126/83 97 %   09/06/21 2023 98.6 °F (37 °C) 64 16 127/67 95 %   09/06/21 1600  77  130/80    09/06/21 1535 98.2 °F (36.8 °C) 74 17 (!) 137/90 95 %   09/06/21 1314  70  126/69    09/06/21 1133 98.3 °F (36.8 °C) 69 18 (!) 143/93 94 %     Oxygen Therapy  O2 Sat (%): 95 % (09/07/21 0753)  Pulse via Oximetry: 88 beats per minute (09/04/21 0240)  O2 Device: None (Room air) (09/05/21 1935)    Estimated body mass index is 23.01 kg/m² as calculated from the following:    Height as of this encounter: 5' 11\" (1.803 m).     Weight as of this encounter: 74.8 kg (165 lb). Intake/Output Summary (Last 24 hours) at 9/7/2021 0952  Last data filed at 9/6/2021 1535  Gross per 24 hour   Intake    Output 600 ml   Net -600 ml         Physical Exam:    Visit Vitals  /82 (BP 1 Location: Right upper arm, BP Patient Position: At rest)   Pulse (!) 51   Temp 98.2 °F (36.8 °C)   Resp 16   Ht 5' 11\" (1.803 m)   Wt 74.8 kg (165 lb)   SpO2 95%   BMI 23.01 kg/m²        General:          Well nourished. No overt distress. Patient is siting up in bed. He is talking well. He answers questions well. He is eating breakfast well. Head:               Normocephalic, atraumatic  Eyes:               Sclerae appear normal.  Pupils equally round. ENT:                Nares appear normal, no drainage. Moist oral mucosa  Neck:               No restricted ROM. Trachea midline   CV:                  RRR. No m/r/g. No jugular venous distension. Lungs:             CTAB. No wheezing, rhonchi, or rales. Respirations even, unlabored  Abdomen: Bowel sounds present. Soft, nontender, less distended. Previous surgical scars. Extremities:     No cyanosis or clubbing. No edema  Skin:                No rashes and normal coloration. Warm and dry. Neuro:             Cranial nerves II-XII grossly intact. Sensation intact  Psych:             Normal mood and affect. Alert and oriented x3    Signed:  Lyle Walker MD    Part of this note may have been written by using a voice dictation software. The note has been proof read but may still contain some grammatical/other typographical errors.

## 2021-09-07 NOTE — PROGRESS NOTES
Problem: Falls - Risk of  Goal: *Absence of Falls  Description: Document Hinkley Arnulfo Fall Risk and appropriate interventions in the flowsheet.   Outcome: Progressing Towards Goal  Note: Fall Risk Interventions:            Medication Interventions: Teach patient to arise slowly                   Problem: Patient Education: Go to Patient Education Activity  Goal: Patient/Family Education  Outcome: Progressing Towards Goal

## 2021-09-07 NOTE — PROGRESS NOTES
Problem: Falls - Risk of  Goal: *Absence of Falls  Description: Document Dariana Olvera Fall Risk and appropriate interventions in the flowsheet.   Outcome: Progressing Towards Goal  Note: Fall Risk Interventions:     Medication Interventions: Teach patient to arise slowly    Problem: Patient Education: Go to Patient Education Activity  Goal: Patient/Family Education  Outcome: Progressing Towards Goal

## 2021-09-07 NOTE — CONSULTS
Consult received for SBO  Pt known to me from prior surgery. Chart reviewed- pt has already been discharged, thus no need for surgical consultation.

## 2022-03-18 PROBLEM — C18.9 COLON CANCER METASTASIZED TO LUNG (HCC): Status: ACTIVE | Noted: 2021-09-04

## 2022-03-18 PROBLEM — C78.00 COLON CANCER METASTASIZED TO LUNG (HCC): Status: ACTIVE | Noted: 2021-09-04

## 2022-03-19 PROBLEM — K56.609 SBO (SMALL BOWEL OBSTRUCTION) (HCC): Status: ACTIVE | Noted: 2021-09-04

## 2022-03-19 PROBLEM — R91.8 LUNG MASS: Status: ACTIVE | Noted: 2019-05-07

## 2022-03-19 PROBLEM — R91.8 PULMONARY NODULES: Status: ACTIVE | Noted: 2019-05-03

## 2022-07-13 NOTE — PROGRESS NOTES
Patient reports concern over prostate cancer  Also reports concern over general health  Currently Asymptomatic and denies any complaints  No personal or fam hx of prostate cancer  Counseled patient on screening recommendations and via shared decision making, patient is agreeable to NOT testing at this time       Patient reminded to complete lab work ordered at previous visit SW reviewed patient's chart and conducted a baseline assessment. Discharge plan at this time is as follows:     Care Management Interventions  PCP Verified by CM: Yes  Mode of Transport at Discharge: Self  Transition of Care Consult (CM Consult): Discharge Planning  Current Support Network: Own Home, Lives with Spouse, Family Lives Nearby  Confirm Follow Up Transport: Family  Discharge Location  Discharge Placement: Home with family assistance      *Please note that discharge plans can change throughout an inpatient admission.  Ensure that you are referring to the most recent social work/nurse case management note for current discharge plan*     Ana Anthony, [address] [place] [address] Work   [facility]    * [email or web address]
